# Patient Record
Sex: MALE | Race: WHITE | HISPANIC OR LATINO | Employment: FULL TIME | URBAN - NONMETROPOLITAN AREA
[De-identification: names, ages, dates, MRNs, and addresses within clinical notes are randomized per-mention and may not be internally consistent; named-entity substitution may affect disease eponyms.]

---

## 2023-02-12 ENCOUNTER — HOSPITAL ENCOUNTER (EMERGENCY)
Facility: HOSPITAL | Age: 43
Discharge: HOME | End: 2023-02-13
Attending: EMERGENCY MEDICINE

## 2023-02-12 DIAGNOSIS — K35.30 ACUTE APPENDICITIS WITH LOCALIZED PERITONITIS, WITHOUT PERFORATION, ABSCESS, OR GANGRENE: Primary | ICD-10-CM

## 2023-02-12 DIAGNOSIS — K37 APPENDICITIS: ICD-10-CM

## 2023-02-12 LAB
BACTERIA UR QL AUTO: ABNORMAL /HPF
BILIRUB UR QL STRIP: NEGATIVE
CLARITY UR: CLEAR
COLOR UR: ABNORMAL
GLUCOSE UR STRIP-MCNC: NEGATIVE MG/DL
HGB UR QL STRIP.AUTO: ABNORMAL
HYALINE CASTS UR QL AUTO: ABNORMAL /LPF
KETONES UR QL STRIP: NEGATIVE
LEUKOCYTE ESTERASE UR QL STRIP.AUTO: NEGATIVE
NITRITE UR QL STRIP: NEGATIVE
PH UR STRIP.AUTO: 7 [PH] (ref 5–8)
PROT UR QL STRIP: ABNORMAL
RBC # UR STRIP: ABNORMAL /HPF
REF LAB TEST METHOD: ABNORMAL
SP GR UR STRIP: 1.02 (ref 1–1.03)
SQUAMOUS #/AREA URNS HPF: ABNORMAL /HPF
UROBILINOGEN UR QL STRIP: ABNORMAL
WBC # UR STRIP: ABNORMAL /HPF

## 2023-02-12 PROCEDURE — 99284 EMERGENCY DEPT VISIT MOD MDM: CPT

## 2023-02-12 PROCEDURE — 81001 URINALYSIS AUTO W/SCOPE: CPT | Performed by: EMERGENCY MEDICINE

## 2023-02-12 RX ORDER — ONDANSETRON 2 MG/ML
4 INJECTION INTRAMUSCULAR; INTRAVENOUS ONCE
Status: COMPLETED | OUTPATIENT
Start: 2023-02-12 | End: 2023-02-13

## 2023-02-12 RX ORDER — SODIUM CHLORIDE 0.9 % (FLUSH) 0.9 %
10 SYRINGE (ML) INJECTION AS NEEDED
Status: DISCONTINUED | OUTPATIENT
Start: 2023-02-12 | End: 2023-02-13 | Stop reason: HOSPADM

## 2023-02-12 RX ORDER — KETOROLAC TROMETHAMINE 30 MG/ML
15 INJECTION, SOLUTION INTRAMUSCULAR; INTRAVENOUS ONCE
Status: COMPLETED | OUTPATIENT
Start: 2023-02-12 | End: 2023-02-13

## 2023-02-13 ENCOUNTER — ANESTHESIA EVENT (OUTPATIENT)
Dept: PERIOP | Facility: HOSPITAL | Age: 43
End: 2023-02-13

## 2023-02-13 ENCOUNTER — APPOINTMENT (OUTPATIENT)
Dept: CT IMAGING | Facility: HOSPITAL | Age: 43
End: 2023-02-13

## 2023-02-13 ENCOUNTER — ANESTHESIA (OUTPATIENT)
Dept: PERIOP | Facility: HOSPITAL | Age: 43
End: 2023-02-13

## 2023-02-13 VITALS
TEMPERATURE: 97.6 F | HEART RATE: 96 BPM | OXYGEN SATURATION: 96 % | SYSTOLIC BLOOD PRESSURE: 120 MMHG | HEIGHT: 72 IN | DIASTOLIC BLOOD PRESSURE: 88 MMHG | RESPIRATION RATE: 16 BRPM | BODY MASS INDEX: 30.2 KG/M2 | WEIGHT: 223 LBS

## 2023-02-13 PROBLEM — K35.30 ACUTE APPENDICITIS WITH LOCALIZED PERITONITIS, WITHOUT PERFORATION, ABSCESS, OR GANGRENE: Status: ACTIVE | Noted: 2023-02-13

## 2023-02-13 LAB
ALBUMIN SERPL-MCNC: 4.6 G/DL (ref 3.5–5.2)
ALBUMIN/GLOB SERPL: 1.3 G/DL
ALP SERPL-CCNC: 77 U/L (ref 39–117)
ALT SERPL W P-5'-P-CCNC: 77 U/L (ref 1–41)
ANION GAP SERPL CALCULATED.3IONS-SCNC: 13.3 MMOL/L (ref 5–15)
AST SERPL-CCNC: 22 U/L (ref 1–40)
BASOPHILS # BLD AUTO: 0.05 10*3/MM3 (ref 0–0.2)
BASOPHILS NFR BLD AUTO: 0.3 % (ref 0–1.5)
BILIRUB SERPL-MCNC: 4 MG/DL (ref 0–1.2)
BUN SERPL-MCNC: 15 MG/DL (ref 6–20)
BUN/CREAT SERPL: 16.9 (ref 7–25)
CALCIUM SPEC-SCNC: 9.4 MG/DL (ref 8.6–10.5)
CHLORIDE SERPL-SCNC: 94 MMOL/L (ref 98–107)
CO2 SERPL-SCNC: 23.7 MMOL/L (ref 22–29)
CREAT SERPL-MCNC: 0.89 MG/DL (ref 0.76–1.27)
DEPRECATED RDW RBC AUTO: 40.6 FL (ref 37–54)
EGFRCR SERPLBLD CKD-EPI 2021: 109.7 ML/MIN/1.73
EOSINOPHIL # BLD AUTO: 0.01 10*3/MM3 (ref 0–0.4)
EOSINOPHIL NFR BLD AUTO: 0.1 % (ref 0.3–6.2)
ERYTHROCYTE [DISTWIDTH] IN BLOOD BY AUTOMATED COUNT: 14 % (ref 12.3–15.4)
GLOBULIN UR ELPH-MCNC: 3.5 GM/DL
GLUCOSE SERPL-MCNC: 134 MG/DL (ref 65–99)
HCT VFR BLD AUTO: 45.2 % (ref 37.5–51)
HGB BLD-MCNC: 15.2 G/DL (ref 13–17.7)
HOLD SPECIMEN: NORMAL
HOLD SPECIMEN: NORMAL
IMM GRANULOCYTES # BLD AUTO: 0.1 10*3/MM3 (ref 0–0.05)
IMM GRANULOCYTES NFR BLD AUTO: 0.7 % (ref 0–0.5)
LIPASE SERPL-CCNC: 22 U/L (ref 13–60)
LYMPHOCYTES # BLD AUTO: 1.46 10*3/MM3 (ref 0.7–3.1)
LYMPHOCYTES NFR BLD AUTO: 10.2 % (ref 19.6–45.3)
MCH RBC QN AUTO: 26.7 PG (ref 26.6–33)
MCHC RBC AUTO-ENTMCNC: 33.6 G/DL (ref 31.5–35.7)
MCV RBC AUTO: 79.4 FL (ref 79–97)
MONOCYTES # BLD AUTO: 0.88 10*3/MM3 (ref 0.1–0.9)
MONOCYTES NFR BLD AUTO: 6.1 % (ref 5–12)
NEUTROPHILS NFR BLD AUTO: 11.88 10*3/MM3 (ref 1.7–7)
NEUTROPHILS NFR BLD AUTO: 82.6 % (ref 42.7–76)
NRBC BLD AUTO-RTO: 0 /100 WBC (ref 0–0.2)
PLATELET # BLD AUTO: 253 10*3/MM3 (ref 140–450)
PMV BLD AUTO: 8.2 FL (ref 6–12)
POTASSIUM SERPL-SCNC: 3.7 MMOL/L (ref 3.5–5.2)
PROT SERPL-MCNC: 8.1 G/DL (ref 6–8.5)
RBC # BLD AUTO: 5.69 10*6/MM3 (ref 4.14–5.8)
SODIUM SERPL-SCNC: 131 MMOL/L (ref 136–145)
WBC NRBC COR # BLD: 14.38 10*3/MM3 (ref 3.4–10.8)
WHOLE BLOOD HOLD COAG: NORMAL
WHOLE BLOOD HOLD SPECIMEN: NORMAL

## 2023-02-13 PROCEDURE — 99284 EMERGENCY DEPT VISIT MOD MDM: CPT | Performed by: SURGERY

## 2023-02-13 PROCEDURE — 96375 TX/PRO/DX INJ NEW DRUG ADDON: CPT

## 2023-02-13 PROCEDURE — 0 LIDOCAINE 1 % SOLUTION 20 ML VIAL: Performed by: SURGERY

## 2023-02-13 PROCEDURE — 83690 ASSAY OF LIPASE: CPT

## 2023-02-13 PROCEDURE — 85025 COMPLETE CBC W/AUTO DIFF WBC: CPT

## 2023-02-13 PROCEDURE — 74177 CT ABD & PELVIS W/CONTRAST: CPT

## 2023-02-13 PROCEDURE — 0 AMPICILLIN-SULBACTAM PER 1.5 G: Performed by: EMERGENCY MEDICINE

## 2023-02-13 PROCEDURE — 25010000002 ONDANSETRON PER 1 MG

## 2023-02-13 PROCEDURE — 93005 ELECTROCARDIOGRAM TRACING: CPT | Performed by: EMERGENCY MEDICINE

## 2023-02-13 PROCEDURE — 25010000002 SUCCINYLCHOLINE PER 20 MG: Performed by: NURSE ANESTHETIST, CERTIFIED REGISTERED

## 2023-02-13 PROCEDURE — 25010000002 ONDANSETRON PER 1 MG: Performed by: NURSE ANESTHETIST, CERTIFIED REGISTERED

## 2023-02-13 PROCEDURE — 25010000002 PROPOFOL 200 MG/20ML EMULSION: Performed by: NURSE ANESTHETIST, CERTIFIED REGISTERED

## 2023-02-13 PROCEDURE — 25010000002 IOPAMIDOL 61 % SOLUTION: Performed by: EMERGENCY MEDICINE

## 2023-02-13 PROCEDURE — 44970 LAPAROSCOPY APPENDECTOMY: CPT | Performed by: SURGERY

## 2023-02-13 PROCEDURE — 80053 COMPREHEN METABOLIC PANEL: CPT

## 2023-02-13 PROCEDURE — 96374 THER/PROPH/DIAG INJ IV PUSH: CPT

## 2023-02-13 PROCEDURE — 25010000002 DEXAMETHASONE PER 1 MG: Performed by: NURSE ANESTHETIST, CERTIFIED REGISTERED

## 2023-02-13 PROCEDURE — 25010000002 MORPHINE PER 10 MG: Performed by: EMERGENCY MEDICINE

## 2023-02-13 PROCEDURE — 25010000002 KETOROLAC TROMETHAMINE PER 15 MG

## 2023-02-13 DEVICE — LIGACLIP 10-M/L, 10MM ENDOSCOPIC ROTATING MULTIPLE CLIP APPLIERS
Type: IMPLANTABLE DEVICE | Site: ABDOMEN | Status: FUNCTIONAL
Brand: LIGACLIP

## 2023-02-13 DEVICE — THE ECHELON, ECHELON ENDOPATH™ AND ECHELON FLEX™ FAMILIES OF ENDOSCOPIC LINEAR CUTTERS AND RELOADS ARE STERILE, SINGLE PATIENT USE INSTRUMENTS THAT SIMULTANEOUSLY CUT AND STAPLE TISSUE. THERE ARE SIX STAGGERED ROWS OF STAPLES, THREE ON EITHER SIDE OF THE CUT LINE. THE 45 MM INSTRUMENTS HAVE A STAPLE LINE THATIS APPROXIMATELY 45 MM LONG AND A CUT LINE THAT IS APPROXIMATELY 42 MM LONG. THE SHAFT CAN ROTATE FREELY IN BOTH DIRECTIONS AND AN ARTICULATION MECHANISM ON ARTICULATING INSTRUMENTS ENABLES BENDING THE DISTAL PORTIONOF THE SHAFT TO FACILITATE LATERAL ACCESS OF THE OPERATIVE SITE.THE INSTRUMENTS ARE SHIPPED WITHOUT A RELOAD AND MUST BE LOADED PRIOR TO USE. A STAPLE RETAINING CAP ON THE RELOAD PROTECTS THE STAPLE LEG POINTS DURING SHIPPING AND TRANSPORTATION. THE INSTRUMENTS’ LOCK-OUT FEATURE IS DESIGNED TO PREVENT A USED RELOAD FROM BEING REFIRED.
Type: IMPLANTABLE DEVICE | Site: ABDOMEN | Status: FUNCTIONAL
Brand: ECHELON ENDOPATH

## 2023-02-13 DEVICE — FLOSEAL HEMOSTATIC MATRIX, 10ML
Type: IMPLANTABLE DEVICE | Site: ABDOMEN | Status: FUNCTIONAL
Brand: FLOSEAL HEMOSTATIC MATRIX

## 2023-02-13 RX ORDER — ONDANSETRON 2 MG/ML
INJECTION INTRAMUSCULAR; INTRAVENOUS AS NEEDED
Status: DISCONTINUED | OUTPATIENT
Start: 2023-02-13 | End: 2023-02-13 | Stop reason: SURG

## 2023-02-13 RX ORDER — SODIUM CHLORIDE 9 MG/ML
125 INJECTION, SOLUTION INTRAVENOUS CONTINUOUS
Status: DISCONTINUED | OUTPATIENT
Start: 2023-02-13 | End: 2023-02-13 | Stop reason: HOSPADM

## 2023-02-13 RX ORDER — SODIUM CHLORIDE 450 MG/100ML
INJECTION, SOLUTION INTRAVENOUS CONTINUOUS PRN
Status: DISCONTINUED | OUTPATIENT
Start: 2023-02-13 | End: 2023-02-13 | Stop reason: SURG

## 2023-02-13 RX ORDER — ONDANSETRON 4 MG/1
4 TABLET, FILM COATED ORAL DAILY PRN
Qty: 10 TABLET | Refills: 1 | Status: SHIPPED | OUTPATIENT
Start: 2023-02-13 | End: 2024-02-13

## 2023-02-13 RX ORDER — MORPHINE SULFATE 2 MG/ML
2 INJECTION, SOLUTION INTRAMUSCULAR; INTRAVENOUS
Status: DISCONTINUED | OUTPATIENT
Start: 2023-02-13 | End: 2023-02-13 | Stop reason: HOSPADM

## 2023-02-13 RX ORDER — LIDOCAINE HYDROCHLORIDE 20 MG/ML
INJECTION, SOLUTION INTRAVENOUS AS NEEDED
Status: DISCONTINUED | OUTPATIENT
Start: 2023-02-13 | End: 2023-02-13 | Stop reason: SURG

## 2023-02-13 RX ORDER — AMOXICILLIN AND CLAVULANATE POTASSIUM 875; 125 MG/1; MG/1
1 TABLET, FILM COATED ORAL 2 TIMES DAILY
Qty: 10 TABLET | Refills: 0 | Status: SHIPPED | OUTPATIENT
Start: 2023-02-13 | End: 2023-02-18

## 2023-02-13 RX ORDER — PROPOFOL 10 MG/ML
INJECTION, EMULSION INTRAVENOUS AS NEEDED
Status: DISCONTINUED | OUTPATIENT
Start: 2023-02-13 | End: 2023-02-13 | Stop reason: SURG

## 2023-02-13 RX ORDER — LORAZEPAM 2 MG/ML
1 INJECTION INTRAMUSCULAR ONCE AS NEEDED
Status: DISCONTINUED | OUTPATIENT
Start: 2023-02-13 | End: 2023-02-13 | Stop reason: HOSPADM

## 2023-02-13 RX ORDER — DULOXETIN HYDROCHLORIDE 30 MG/1
120 CAPSULE, DELAYED RELEASE ORAL ONCE
Status: DISCONTINUED | OUTPATIENT
Start: 2023-02-13 | End: 2023-02-13

## 2023-02-13 RX ORDER — HYDROCODONE BITARTRATE AND ACETAMINOPHEN 5; 325 MG/1; MG/1
1-2 TABLET ORAL EVERY 4 HOURS PRN
Qty: 12 TABLET | Refills: 0 | Status: SHIPPED | OUTPATIENT
Start: 2023-02-13 | End: 2023-02-15

## 2023-02-13 RX ORDER — SUCCINYLCHOLINE CHLORIDE 20 MG/ML
INJECTION INTRAMUSCULAR; INTRAVENOUS AS NEEDED
Status: DISCONTINUED | OUTPATIENT
Start: 2023-02-13 | End: 2023-02-13 | Stop reason: SURG

## 2023-02-13 RX ORDER — IBUPROFEN 800 MG/1
800 TABLET ORAL EVERY 6 HOURS PRN
Qty: 16 TABLET | Refills: 0 | Status: SHIPPED | OUTPATIENT
Start: 2023-02-13 | End: 2023-02-18

## 2023-02-13 RX ORDER — DEXAMETHASONE SODIUM PHOSPHATE 4 MG/ML
INJECTION, SOLUTION INTRA-ARTICULAR; INTRALESIONAL; INTRAMUSCULAR; INTRAVENOUS; SOFT TISSUE AS NEEDED
Status: DISCONTINUED | OUTPATIENT
Start: 2023-02-13 | End: 2023-02-13 | Stop reason: SURG

## 2023-02-13 RX ORDER — CHOLECALCIFEROL (VITAMIN D3) 125 MCG
5 CAPSULE ORAL ONCE
Status: DISCONTINUED | OUTPATIENT
Start: 2023-02-13 | End: 2023-02-13

## 2023-02-13 RX ORDER — SODIUM CHLORIDE 9 MG/ML
100 INJECTION, SOLUTION INTRAVENOUS CONTINUOUS
Status: DISCONTINUED | OUTPATIENT
Start: 2023-02-13 | End: 2023-02-13 | Stop reason: HOSPADM

## 2023-02-13 RX ORDER — MEPERIDINE HYDROCHLORIDE 25 MG/ML
25 INJECTION INTRAMUSCULAR; INTRAVENOUS; SUBCUTANEOUS ONCE AS NEEDED
Status: DISCONTINUED | OUTPATIENT
Start: 2023-02-13 | End: 2023-02-13 | Stop reason: HOSPADM

## 2023-02-13 RX ORDER — ONDANSETRON 2 MG/ML
4 INJECTION INTRAMUSCULAR; INTRAVENOUS ONCE AS NEEDED
Status: COMPLETED | OUTPATIENT
Start: 2023-02-13 | End: 2023-02-13

## 2023-02-13 RX ADMIN — SODIUM CHLORIDE: 4.5 INJECTION, SOLUTION INTRAVENOUS at 05:11

## 2023-02-13 RX ADMIN — SODIUM CHLORIDE: 4.5 INJECTION, SOLUTION INTRAVENOUS at 03:02

## 2023-02-13 RX ADMIN — DEXAMETHASONE SODIUM PHOSPHATE 4 MG: 4 INJECTION, SOLUTION INTRAMUSCULAR; INTRAVENOUS at 03:08

## 2023-02-13 RX ADMIN — KETOROLAC TROMETHAMINE 15 MG: 30 INJECTION, SOLUTION INTRAMUSCULAR; INTRAVENOUS at 00:04

## 2023-02-13 RX ADMIN — PROPOFOL 200 MG: 10 INJECTION, EMULSION INTRAVENOUS at 03:08

## 2023-02-13 RX ADMIN — IOPAMIDOL 99 ML: 612 INJECTION, SOLUTION INTRAVENOUS at 00:55

## 2023-02-13 RX ADMIN — SUCCINYLCHOLINE CHLORIDE 100 MG: 20 INJECTION, SOLUTION INTRAMUSCULAR; INTRAVENOUS at 03:08

## 2023-02-13 RX ADMIN — AMPICILLIN SODIUM AND SULBACTAM SODIUM 3 G: 2; 1 INJECTION, POWDER, FOR SOLUTION INTRAVENOUS at 02:15

## 2023-02-13 RX ADMIN — SODIUM CHLORIDE 125 ML/HR: 9 INJECTION, SOLUTION INTRAVENOUS at 02:38

## 2023-02-13 RX ADMIN — ONDANSETRON 4 MG: 2 INJECTION INTRAMUSCULAR; INTRAVENOUS at 03:08

## 2023-02-13 RX ADMIN — MORPHINE SULFATE 4 MG: 4 INJECTION, SOLUTION INTRAMUSCULAR; INTRAVENOUS at 02:12

## 2023-02-13 RX ADMIN — SODIUM CHLORIDE 1000 ML: 9 INJECTION, SOLUTION INTRAVENOUS at 00:04

## 2023-02-13 RX ADMIN — LIDOCAINE HYDROCHLORIDE 60 MG: 20 INJECTION, SOLUTION INTRAVENOUS at 03:08

## 2023-02-13 RX ADMIN — ONDANSETRON 4 MG: 2 INJECTION INTRAMUSCULAR; INTRAVENOUS at 07:16

## 2023-02-13 RX ADMIN — ONDANSETRON 4 MG: 2 INJECTION INTRAMUSCULAR; INTRAVENOUS at 00:04

## 2023-02-13 RX ADMIN — SUGAMMADEX 200 MG: 100 INJECTION, SOLUTION INTRAVENOUS at 05:21

## 2023-02-13 NOTE — ANESTHESIA POSTPROCEDURE EVALUATION
Patient: Omar Gan    Procedure Summary     Date: 02/13/23 Room / Location: [facility]  /  [facility]    Anesthesia Start: 0302 Anesthesia Stop: 0523    Procedure: APPENDECTOMY LAPAROSCOPIC (Abdomen) Diagnosis:       Appendicitis      (appendicitis)    Surgeons: Mayito Wyatt MD Provider: Harjinder Husain CRNA    Anesthesia Type: general ASA Status: 2          Anesthesia Type: general    Vitals  No vitals data found for the desired time range.          Post Anesthesia Care and Evaluation    Patient location during evaluation: PACU  Patient participation: complete - patient participated  Level of consciousness: awake  Pain score: 3  Pain management: adequate    Airway patency: patent  Anesthetic complications: No anesthetic complications  PONV Status: controlled  Cardiovascular status: acceptable and stable  Respiratory status: acceptable and face mask  Hydration status: acceptable    Comments: SEE NURSING NOTES FOR POST OP VITAL SIGNS

## 2023-02-13 NOTE — OP NOTE
PATIENT:    Omar Gan    DATE OF SURGERY:  2/13/2023    PHYSICIAN:    Mayito Wyatt MD    REFERRING PHYSICIAN:  No ref. provider found    YOB: 1980    PREOPERATIVE DIAGNOSIS: appendicitis    POSTOPERATIVE DIAGNOSIS: Appendicitis with possible localized perforation    PROCEDURE: Laparoscopic appendectomy         Specimen: Appendix    EBL: 50    Complications: None        ANESTHESIA:  General Anesthesia     OPERATIVE PROCEDURE:  The patient was taken to the operating room, placed in the supine position, and given general endotracheal anesthesia.  They were prepped and draped in the normal sterile fashion.  They did receive preoperative IV antibiotics.  The nursing staff did perform intraoperative timeout prior to the incision.    Due to previous periumbilical hernia surgery I placed an Optiview 5 mm trocar through the left upper abdomen into the peritoneal cavity.  Parent Celeste was insufflated to 15 mmHg CO2 extensive adhesions to the anterior abdominal wall especially around the umbilicus were encountered.  Mesh was also identified and a 12 mm and 5 mm lower midline ports were placed avoiding this mesh.  A right upper quadrant 5 mm port was also placed.    Dissection was begun to identify the appendix.  The appendix was in the right upper abdomen abutting the liver.  Ultimately the base of the appendix was identified and a window was made through the mesoappendix and an Endo TODD stapler was used to divide the base of the appendix.  Due to the location and extensive inflammatory process the appendix was difficult to dissected free and I divided the mesoappendix between clips and then sharply divided the mesoappendix.  This dissection was continued slowly as the appendix was retrocecal.  Ultimately the entire appendix was dissected free and removed.  Appendix was then removed the 12 mm port site using an Endo Catch bag.  I reinspected the operative area and during dissection there was a probable  localized area of contained perforation but without abscess.  There was some generalized ooze from the dissected area and since I was not able to clip these areas I used Floseal for added hemostasis.  At the conclusion there was no ongoing bleeding.  Ports were removed.  No bleeding is evident at the port sites.  12 mm trocar site fascia was already closed with a figure-of-eight Vicryl closure.  Wounds were closed with interrupted deep dermal PDS closure.  Steri-Strips and dressings were applied.  There were no intraoperative complications.  Patient tolerated procedure well.    The patient was stable at this point in time and subsequently transferred back to the recovery room in stable condition.       Mayito Wyatt MD  2/13/2023  05:15 EST

## 2023-02-13 NOTE — ANESTHESIA PREPROCEDURE EVALUATION
Anesthesia Evaluation     Patient summary reviewed and Nursing notes reviewed   no history of anesthetic complications:  NPO Solid Status: > 8 hours  NPO Liquid Status: > 8 hours           Airway   Mallampati: I  TM distance: >3 FB  Neck ROM: full  no difficulty expected  Dental - normal exam     Pulmonary - normal exam   (+) asthma,  Cardiovascular - negative cardio ROS and normal exam        Neuro/Psych- negative ROS  GI/Hepatic/Renal/Endo - negative ROS     Musculoskeletal (-) negative ROS    Abdominal    Substance History - negative use     OB/GYN negative ob/gyn ROS         Other - negative ROS                       Anesthesia Plan    ASA 2     general     intravenous induction     Anesthetic plan, risks, benefits, and alternatives have been provided, discussed and informed consent has been obtained with: patient.        CODE STATUS:

## 2023-02-13 NOTE — DISCHARGE INSTRUCTIONS
Rest today  No pushing,pulling,tugging,heavy lifting, or strenuous activity   No major decision making,driving,or drinking alcoholic beverages for 24 hours due to the sedation you received    Always use good hand hygiene/washing technique    To assist you in voiding:  Drink plenty of fluids  Listen to running water while attempting to void.    If you are unable to urinate and you have an uncomfortable urge to void or it has been   6 hours since you were discharged, return to the Emergency Room No driving on pain medication.     Please follow all post op instructions and follow up appointment time from your physician's office included in your discharge packet.  .    No pushing, pulling, tugging,  heavy lifting, or strenuous activity.  No major decision making, driving, or drinking alcoholic beverages for 24 hours. ( due to the medications you have  received)  Always use good hand hygiene/washing techniques.  NO driving while taking pain medications.    * if you have an incision:  Check your incision area every day for signs of infection.   Check for:  * more redness, swelling, or pain  *more fluid or blood  *warmth  *pus or bad smell To assist you in voiding:  Drink plenty of fluids  Listen to running water while attempting to void.    If you are unable to urinate and you have an uncomfortable urge to void or it has been   6 hours since you were discharged, return to the Emergency Room

## 2023-02-13 NOTE — ED PROVIDER NOTES
"Subjective  History of Present Illness:    Patient is a 42-year-old male here today with abdominal pain.  He states that last night and has persisted throughout the day.  Has had associated nausea and vomiting, and fever.  He went to one of the local urgent treatment centers and was advised to come to the ER for further evaluation and need for possible CT scan.  Patient reports that the pain is primarily to the right side of his abdomen especially to the lower region and feels like it is radiating towards his back.  He denies any urinary symptoms such as dysuria or urinary frequency.  He states that anything he tries to eat makes him feel nauseated and he will eventually vomit.  History of an umbilical hernia with repair, states that he has mesh.  Other history includes asthma and psoriasis.        Nurses Notes reviewed and agree, including vitals, allergies, social history and prior medical history.     REVIEW OF SYSTEMS: All systems reviewed and not pertinent unless noted.  Review of Systems    Past Medical History:   Diagnosis Date   • Asthma    • Psoriasis        Allergies:    Patient has no known allergies.      Past Surgical History:   Procedure Laterality Date   • HERNIA REPAIR           Social History     Socioeconomic History   • Marital status:    Tobacco Use   • Smoking status: Never   • Smokeless tobacco: Never   Substance and Sexual Activity   • Alcohol use: Yes     Comment: Socially         History reviewed. No pertinent family history.    Objective  Physical Exam:  /88   Pulse 105   Temp 100.2 °F (37.9 °C) (Oral)   Resp 20   Ht 182.9 cm (72\")   Wt 101 kg (223 lb)   SpO2 94%   BMI 30.24 kg/m²      Physical Exam  Vitals and nursing note reviewed.   Constitutional:       Appearance: Normal appearance. He is normal weight.   HENT:      Head: Normocephalic and atraumatic.   Cardiovascular:      Rate and Rhythm: Normal rate and regular rhythm.      Pulses: Normal pulses.      Heart " sounds: Normal heart sounds.   Pulmonary:      Effort: Pulmonary effort is normal.      Breath sounds: Normal breath sounds.   Abdominal:      General: Abdomen is flat. Bowel sounds are normal. There is no distension.      Palpations: Abdomen is soft.      Tenderness: There is abdominal tenderness in the right lower quadrant. Positive signs include McBurney's sign and psoas sign.      Comments: Patient also has tenderness that radiates to the right side of his abdomen mid axillary line and posterior axillary line   Musculoskeletal:      Right lower leg: No edema.      Left lower leg: No edema.   Skin:     General: Skin is warm and dry.      Capillary Refill: Capillary refill takes less than 2 seconds.   Neurological:      General: No focal deficit present.      Mental Status: He is alert and oriented to person, place, and time.   Psychiatric:         Mood and Affect: Mood normal.         Behavior: Behavior normal.         Procedures    ED Course:    ED Course as of 02/13/23 0224 Mon Feb 13, 2023   0131 Spoke with Dr. Wyatt about the CT findings showing acute appendicitis. [TA]      ED Course User Index  [TA] Chuckie Moore, TEN       Lab Results (last 24 hours)     Procedure Component Value Units Date/Time    Urinalysis With Microscopic If Indicated (No Culture) - Urine, Clean Catch [[id number]]  (Abnormal) Collected: 02/12/23 2319    Specimen: Urine, Clean Catch Updated: 02/12/23 2326     Color, UA Sac     Appearance, UA Clear     pH, UA 7.0     Specific Gravity, UA 1.020     Glucose, UA Negative     Ketones, UA Negative     Bilirubin, UA Negative     Blood, UA Moderate (2+)     Protein,  mg/dL (2+)     Leuk Esterase, UA Negative     Nitrite, UA Negative     Urobilinogen, UA 1.0 E.U./dL    Urinalysis, Microscopic Only - Urine, Clean Catch [[id number]]  (Abnormal) Collected: 02/12/23 2319    Specimen: Urine, Clean Catch Updated: 02/12/23 2335     RBC, UA 3-5 /HPF      WBC, UA 0-2 /HPF      Bacteria, UA  None Seen /HPF      Squamous Epithelial Cells, UA None Seen /HPF      Hyaline Casts, UA None Seen /LPF      Methodology Manual Light Microscopy    CBC & Differential [[id number]]  (Abnormal) Collected: 02/13/23 0006    Specimen: Blood Updated: 02/13/23 0012    Narrative:      The following orders were created for panel order CBC & Differential.  Procedure                               Abnormality         Status                     ---------                               -----------         ------                     CBC Auto Differential[[id number]]        Abnormal            Final result                 Please view results for these tests on the individual orders.    Comprehensive Metabolic Panel [[id number]]  (Abnormal) Collected: 02/13/23 0006    Specimen: Blood Updated: 02/13/23 0030     Glucose 134 mg/dL      BUN 15 mg/dL      Creatinine 0.89 mg/dL      Sodium 131 mmol/L      Potassium 3.7 mmol/L      Chloride 94 mmol/L      CO2 23.7 mmol/L      Calcium 9.4 mg/dL      Total Protein 8.1 g/dL      Albumin 4.6 g/dL      ALT (SGPT) 77 U/L      AST (SGOT) 22 U/L      Alkaline Phosphatase 77 U/L      Total Bilirubin 4.0 mg/dL      Globulin 3.5 gm/dL      A/G Ratio 1.3 g/dL      BUN/Creatinine Ratio 16.9     Anion Gap 13.3 mmol/L      eGFR 109.7 mL/min/1.73     Narrative:      GFR Normal >60  Chronic Kidney Disease <60  Kidney Failure <15      Lipase [[id number]]  (Normal) Collected: 02/13/23 0006    Specimen: Blood Updated: 02/13/23 0030     Lipase 22 U/L     CBC Auto Differential [[id number]]  (Abnormal) Collected: 02/13/23 0006    Specimen: Blood Updated: 02/13/23 0012     WBC 14.38 10*3/mm3      RBC 5.69 10*6/mm3      Hemoglobin 15.2 g/dL      Hematocrit 45.2 %      MCV 79.4 fL      MCH 26.7 pg      MCHC 33.6 g/dL      RDW 14.0 %      RDW-SD 40.6 fl      MPV 8.2 fL      Platelets 253 10*3/mm3      Neutrophil % 82.6 %      Lymphocyte % 10.2 %      Monocyte % 6.1 %      Eosinophil % 0.1 %      Basophil % 0.3 %       Immature Grans % 0.7 %      Neutrophils, Absolute 11.88 10*3/mm3      Lymphocytes, Absolute 1.46 10*3/mm3      Monocytes, Absolute 0.88 10*3/mm3      Eosinophils, Absolute 0.01 10*3/mm3      Basophils, Absolute 0.05 10*3/mm3      Immature Grans, Absolute 0.10 10*3/mm3      nRBC 0.0 /100 WBC            CT Abdomen Pelvis With Contrast    Addendum Date: 2/13/2023    ADDENDUM REPORT ADDENDUM: This report was discussed with Chuckie Moore on Feb 13, 2023 01:29:00 EST. Authenticated and Electronically Signed by Isidro Nelson MD on 02/13/2023 01:29:28 AM    Result Date: 2/13/2023  FINAL REPORT TECHNIQUE: null CLINICAL HISTORY: RLQ pain COMPARISON: null FINDINGS: CT abdomen and pelvis with contrast Comparison: None Findings: Distal aspects appendix dilated at 11 mm with medial inflammatory stranding. Mid and distal aspects of the appendix abut the adjacent liver edge. Small amount of periappendiceal fluid is also demonstrated. Findings do not arise from adjacent terminal ileum or cecum. Proximal and mid aspects of the appendix are normal. Findings are concerning for acute appendicitis. Right middle and lower lobe atelectasis noted. Left lung base clear. No acute bony abnormalities. Fatty infiltration of liver without focal abnormality. Gallbladder and spleen within normal limits. Pancreas, adrenal glands and kidneys unremarkable. Renal cysts without stones or hydronephrosis. No aortic aneurysm or dissection noted. No significant free fluid or adenopathy noted in the pelvis. No evidence for diverticulitis.     Impression: Impression: Acute appendicitis without complicating features Authenticated and Electronically Signed by Isidro Nelson MD on 02/13/2023 01:27:02 AM         [facility]    Initial impression of presenting illness: Abdominal pain    DDX: includes but is not limited to: Appendicitis, small bowel obstruction, kidney stone, hernia, UTI    Patient arrives tachycardic, hemodynamically stable with vitals interpreted by  myself.     Pertinent features from physical exam: Abdominal pain as described above.    Initial diagnostic plan: IV, fluids, Toradol, Zofran, labs, CT scan with contrast    Results from initial plan were reviewed and interpreted by me revealing white blood count of 14.3.  Other labs within her normal ranges.  No evidence of infection on urine, some mild blood.  CT report shows acute appendicitis.    Diagnostic information from other sources: Medical record    Interventions / Re-evaluation: Patient did not have much improvement in his pain with the medication.    Results/clinical rationale were discussed with Dr. Sylvester    Consultations/Discussion of results with other physicians: Spoke with Dr. Wyatt about the patient and the CT findings.  We will plan to take patient to OR.    Disposition plan: Patient is a 42-year-old male here today for acute appendicitis.  He received IV fluid bolus, as well as Zofran and Toradol.  After receiving the diagnosis of appendicitis, he was given morphine as needed to help with pain, and is awaiting to be taken to the OR for further treatment.  -----    Final diagnoses:   Acute appendicitis with localized peritonitis, without perforation, abscess, or gangrene        Chuckie Moore, APRN  02/13/23 0224

## 2023-02-13 NOTE — ANESTHESIA PROCEDURE NOTES
Airway  Urgency: elective    Date/Time: 2/13/2023 3:08 AM  Airway not difficult    General Information and Staff    Patient location during procedure: OR  CRNA/CAA: Harjinder Husain CRNA    Indications and Patient Condition  Indications for airway management: airway protection    Preoxygenated: yes  Mask difficulty assessment: 1 - vent by mask    Final Airway Details  Final airway type: endotracheal airway      Successful airway: ETT  Cuffed: yes   Successful intubation technique: direct laryngoscopy  Endotracheal tube insertion site: oral  Blade: Orestes  Blade size: 3  ETT size (mm): 7.0  Cormack-Lehane Classification: grade I - full view of glottis  Placement verified by: chest auscultation and capnometry   Measured from: lips  ETT/EBT  to lips (cm): 21  Number of attempts at approach: 1  Assessment: lips, teeth, and gum same as pre-op and atraumatic intubation

## 2023-02-13 NOTE — H&P
[facility]   HISTORY AND PHYSICAL      Name:  Omar Gan   Age:  42 y.o.  Sex:  male  :  1980  MRN:  [id number]   Visit Number:  [id number]  Admission Date:  2023  Date Of Service:  23  Primary Care Physician:  Jason Johnson MD    Chief Complaint:     I have abdominal pain    History Of Presenting Illness:      Patient states that yesterday he developed abdominal pain.  Right lower abdomen radiating into the back.  Associated with nausea and vomiting.  He came to emergency room was further evaluated and found to have appendicitis.    Review Of Systems:     General ROS: Patient denies any fevers, chills or loss of consciousness.  No complaints of generalized weakness  Psychological ROS: Denies any hallucinations and delusions.  Ophthalmic ROS: no transient loss of vision.  ENT ROS: Denies sore throat, nasal congestion or ear pain.   Allergy and Immunology ROS: Denies rash or itching.  Hematological and Lymphatic ROS: Denies neck swelling or easy bleeding.  Respiratory ROS: Denies cough or shortness of breath.   Cardiovascular ROS: Denies chest pain or palpitations. No history of exertional chest pain.   Gastrointestinal ROS: As per history of present illness  Genito-Urinary ROS: Denies dysuria or hematuria.  Musculoskeletal ROS: no back pain. No muscle pain. No calf pain.   Neurological ROS: Denies any focal weakness. No loss of consciousness. Denies any numbness.   Dermatological ROS: Denies any redness or pruritis.     Past Medical History:    Past Medical History:   Diagnosis Date   • Asthma    • Psoriasis        Past Surgical history:    Past Surgical History:   Procedure Laterality Date   • HERNIA REPAIR         Social History:    Social History     Socioeconomic History   • Marital status:    Tobacco Use   • Smoking status: Never   • Smokeless tobacco: Never   Substance and Sexual Activity   • Alcohol use: Yes     Comment: Socially       Family  History:    History reviewed. No pertinent family history.    Allergies:      Patient has no known allergies.    Home Medications:    Prior to Admission Medications     None             ED Medications:    Medications   sodium chloride 0.9 % flush 10 mL (has no administration in time range)   morphine injection 4 mg (4 mg Intravenous Given 2/13/23 0212)   sodium chloride 0.9 % infusion (125 mL/hr Intravenous New Bag 2/13/23 0238)   sodium chloride 0.9 % infusion (has no administration in time range)   sodium chloride 0.9 % bolus 1,000 mL (0 mL Intravenous Stopped 2/13/23 0236)   ondansetron (ZOFRAN) injection 4 mg (4 mg Intravenous Given 2/13/23 0004)   ketorolac (TORADOL) injection 15 mg (15 mg Intravenous Given 2/13/23 0004)   iopamidol (ISOVUE-300) 61 % injection 100 mL (99 mL Intravenous Given 2/13/23 0055)   ampicillin-sulbactam (UNASYN) 3 g in sodium chloride 0.9 % 100 mL IVPB (3 g Intravenous New Bag 2/13/23 0215)       Vital Signs:    Temp:  [100.2 °F (37.9 °C)] 100.2 °F (37.9 °C)  Heart Rate:  [102-116] 105  Resp:  [18-20] 20  BP: (110-138)/(80-93) 117/88        02/12/23 2234   Weight: 101 kg (223 lb)       Body mass index is 30.24 kg/m².    Physical Exam:      General Appearance:  Alert and cooperative, not in any acute distress.   Head:  Atraumatic and normocephalic, without obvious abnormality.   Eyes:          PERRLA, conjunctivae and sclerae normal, no Icterus. No pallor. Extra-occular movements are within normal limits.   Ears:  Ears appear intact with no abnormalities noted.   Throat: No oral lesions, no thrush, oral mucosa moist.   Respiratory/Lungs:   Breath sounds heard bilaterally equally.  No crackles or wheezing. No Pleural rub or bronchial breathing. Normal respiratory effort.    Cardiovascular/Heart:  Normal S1 and S2, no murmur. No edema   GI/Abdomen:   No masses, no hepatosplenomegaly. Soft,  non-distended, Tender with localized guarding in the right lower quadrant.     Musculoskeletal/  Extremities:   Moves all extremities well   Skin: No bleeding, bruising or rash, no induration   Psychiatric : Alert and oriented ×3.  No depression or anxiety    Neurologic: Cranial nerves 2 - 12 grossly intact, sensation intact, Motor power is normal and equal bilaterally.       EKG:          Labs:    Lab Results (last 24 hours)     Procedure Component Value Units Date/Time    [place] Draw [[id number]] Collected: 02/13/23 0006    Specimen: Blood Updated: 02/13/23 0115    Narrative:      The following orders were created for panel order [place] Draw.  Procedure                               Abnormality         Status                     ---------                               -----------         ------                     Green Top (Gel)[[id number]]                                  Final result               Lavender Top[[id number]]                                     Final result               Gold Top - SST[[id number]]                                   Final result               Light Blue Top[[id number]]                                   Final result                 Please view results for these tests on the individual orders.    Lavender Top [[id number]] Collected: 02/13/23 0006    Specimen: Blood Updated: 02/13/23 0115     Extra Tube hold for add-on     Comment: Auto resulted       Gold Top - SST [[id number]] Collected: 02/13/23 0006    Specimen: Blood Updated: 02/13/23 0115     Extra Tube Hold for add-ons.     Comment: Auto resulted.       Light Blue Top [[id number]] Collected: 02/13/23 0006    Specimen: Blood Updated: 02/13/23 0115     Extra Tube Hold for add-ons.     Comment: Auto resulted       Green Top (Gel) [[id number]] Collected: 02/13/23 0006    Specimen: Blood Updated: 02/13/23 0115     Extra Tube Hold for add-ons.     Comment: Auto resulted.       Comprehensive Metabolic Panel [[id number]]  (Abnormal) Collected: 02/13/23 0006    Specimen: Blood Updated: 02/13/23 0030     Glucose 134 mg/dL      BUN 15 mg/dL       Creatinine 0.89 mg/dL      Sodium 131 mmol/L      Potassium 3.7 mmol/L      Chloride 94 mmol/L      CO2 23.7 mmol/L      Calcium 9.4 mg/dL      Total Protein 8.1 g/dL      Albumin 4.6 g/dL      ALT (SGPT) 77 U/L      AST (SGOT) 22 U/L      Alkaline Phosphatase 77 U/L      Total Bilirubin 4.0 mg/dL      Globulin 3.5 gm/dL      A/G Ratio 1.3 g/dL      BUN/Creatinine Ratio 16.9     Anion Gap 13.3 mmol/L      eGFR 109.7 mL/min/1.73     Narrative:      GFR Normal >60  Chronic Kidney Disease <60  Kidney Failure <15      Lipase [[id number]]  (Normal) Collected: 02/13/23 0006    Specimen: Blood Updated: 02/13/23 0030     Lipase 22 U/L     CBC & Differential [[id number]]  (Abnormal) Collected: 02/13/23 0006    Specimen: Blood Updated: 02/13/23 0012    Narrative:      The following orders were created for panel order CBC & Differential.  Procedure                               Abnormality         Status                     ---------                               -----------         ------                     CBC Auto Differential[[id number]]        Abnormal            Final result                 Please view results for these tests on the individual orders.    CBC Auto Differential [[id number]]  (Abnormal) Collected: 02/13/23 0006    Specimen: Blood Updated: 02/13/23 0012     WBC 14.38 10*3/mm3      RBC 5.69 10*6/mm3      Hemoglobin 15.2 g/dL      Hematocrit 45.2 %      MCV 79.4 fL      MCH 26.7 pg      MCHC 33.6 g/dL      RDW 14.0 %      RDW-SD 40.6 fl      MPV 8.2 fL      Platelets 253 10*3/mm3      Neutrophil % 82.6 %      Lymphocyte % 10.2 %      Monocyte % 6.1 %      Eosinophil % 0.1 %      Basophil % 0.3 %      Immature Grans % 0.7 %      Neutrophils, Absolute 11.88 10*3/mm3      Lymphocytes, Absolute 1.46 10*3/mm3      Monocytes, Absolute 0.88 10*3/mm3      Eosinophils, Absolute 0.01 10*3/mm3      Basophils, Absolute 0.05 10*3/mm3      Immature Grans, Absolute 0.10 10*3/mm3      nRBC 0.0 /100 WBC     Urinalysis,  Microscopic Only - Urine, Clean Catch [[id number]]  (Abnormal) Collected: 02/12/23 2319    Specimen: Urine, Clean Catch Updated: 02/12/23 2335     RBC, UA 3-5 /HPF      WBC, UA 0-2 /HPF      Bacteria, UA None Seen /HPF      Squamous Epithelial Cells, UA None Seen /HPF      Hyaline Casts, UA None Seen /LPF      Methodology Manual Light Microscopy    Urinalysis With Microscopic If Indicated (No Culture) - Urine, Clean Catch [[id number]]  (Abnormal) Collected: 02/12/23 2319    Specimen: Urine, Clean Catch Updated: 02/12/23 2326     Color, UA [place]     Appearance, UA Clear     pH, UA 7.0     Specific Gravity, UA 1.020     Glucose, UA Negative     Ketones, UA Negative     Bilirubin, UA Negative     Blood, UA Moderate (2+)     Protein,  mg/dL (2+)     Leuk Esterase, UA Negative     Nitrite, UA Negative     Urobilinogen, UA 1.0 E.U./dL          Radiology:    Imaging Results (Last 72 Hours)     Procedure Component Value Units Date/Time    CT Abdomen Pelvis With Contrast [[id number]] Collected: 02/13/23 0127     Updated: 02/13/23 0131    Addenda:        ADDENDUM REPORT    ADDENDUM:  This report was discussed with Chuckie Moore on Feb 13, 2023 01:29:00 EST.    Authenticated and Electronically Signed by Isidro Nelson MD on  02/13/2023 01:29:28 AM  Signed: 02/13/23 0129 by Isidro Nelson MD    Narrative:      FINAL REPORT    TECHNIQUE:  null    CLINICAL HISTORY:  RLQ pain    COMPARISON:  null    FINDINGS:  CT abdomen and pelvis with contrast    Comparison: None    Findings:    Distal aspects appendix dilated at 11 mm with medial inflammatory stranding.    Mid and distal aspects of the appendix abut the adjacent liver edge.    Small amount of periappendiceal fluid is also demonstrated.    Findings do not arise from adjacent terminal ileum or cecum.    Proximal and mid aspects of the appendix are normal.    Findings are concerning for acute appendicitis.    Right middle and lower lobe atelectasis noted.    Left lung base  clear. No acute bony abnormalities.    Fatty infiltration of liver without focal abnormality.    Gallbladder and spleen within normal limits.    Pancreas, adrenal glands and kidneys unremarkable.    Renal cysts without stones or hydronephrosis.    No aortic aneurysm or dissection noted.    No significant free fluid or adenopathy noted in the pelvis.    No evidence for diverticulitis.      Impression:      Impression:    Acute appendicitis without complicating features    Authenticated and Electronically Signed by Isidro Nelson MD on  02/13/2023 01:27:02 AM      I reviewed the CT scan and agree with the interpretation    Assessment:    Acute appendicitis    Plan:     I recommend a laparoscopic appendectomy to the patient.  The patient understands this procedure as well as the possibility of converting to an open procedure.  As well, the patient understands the risks which include but are not limited to bleeding, infection including abscess, bowel injury, cardiopulmonary risks etc. The patient understands    all of the above and wishes to proceed.    Mayito Wyatt MD  02/13/23  02:48 EST  .

## 2023-02-14 LAB — REF LAB TEST METHOD: NORMAL

## 2023-02-15 ENCOUNTER — TELEPHONE (OUTPATIENT)
Dept: SURGERY | Facility: CLINIC | Age: 43
End: 2023-02-15

## 2023-02-15 DIAGNOSIS — K35.30 ACUTE APPENDICITIS WITH LOCALIZED PERITONITIS, WITHOUT PERFORATION, ABSCESS, OR GANGRENE: Primary | ICD-10-CM

## 2023-02-15 RX ORDER — HYDROCODONE BITARTRATE AND ACETAMINOPHEN 7.5; 325 MG/1; MG/1
1 TABLET ORAL EVERY 6 HOURS PRN
Qty: 15 TABLET | Refills: 0 | Status: SHIPPED | OUTPATIENT
Start: 2023-02-15

## 2023-02-15 NOTE — TELEPHONE ENCOUNTER
Patient called requesting a refill on Hydrocodone 5-325 mg to be sent to [facility] pharmacy in [place]. Post-op appointment is scheduled for 2/21/23.

## 2023-02-20 ENCOUNTER — TELEPHONE (OUTPATIENT)
Dept: SURGERY | Facility: CLINIC | Age: 43
End: 2023-02-20

## 2023-02-20 ENCOUNTER — HOSPITAL ENCOUNTER (INPATIENT)
Facility: HOSPITAL | Age: 43
LOS: 2 days | Discharge: HOME | DRG: 862 | End: 2023-02-22
Attending: INTERNAL MEDICINE | Admitting: INTERNAL MEDICINE

## 2023-02-20 ENCOUNTER — HOSPITAL ENCOUNTER (OUTPATIENT)
Dept: CT IMAGING | Facility: HOSPITAL | Age: 43
Discharge: HOME | End: 2023-02-20
Admitting: STUDENT IN AN ORGANIZED HEALTH CARE EDUCATION/TRAINING PROGRAM

## 2023-02-20 ENCOUNTER — OFFICE VISIT (OUTPATIENT)
Dept: SURGERY | Facility: CLINIC | Age: 43
End: 2023-02-20

## 2023-02-20 VITALS
TEMPERATURE: 97.3 F | SYSTOLIC BLOOD PRESSURE: 142 MMHG | HEART RATE: 112 BPM | OXYGEN SATURATION: 98 % | DIASTOLIC BLOOD PRESSURE: 82 MMHG | HEIGHT: 72 IN | RESPIRATION RATE: 18 BRPM | WEIGHT: 218 LBS | BODY MASS INDEX: 29.53 KG/M2

## 2023-02-20 DIAGNOSIS — R10.11 RIGHT UPPER QUADRANT ABDOMINAL PAIN: Primary | ICD-10-CM

## 2023-02-20 DIAGNOSIS — R10.11 RIGHT UPPER QUADRANT ABDOMINAL PAIN: ICD-10-CM

## 2023-02-20 PROBLEM — T81.49XA ABSCESS AFTER PROCEDURE: Status: ACTIVE | Noted: 2023-02-20

## 2023-02-20 LAB
ALBUMIN SERPL-MCNC: 3.9 G/DL (ref 3.5–5.2)
ALBUMIN/GLOB SERPL: 0.8 G/DL
ALP SERPL-CCNC: 131 U/L (ref 39–117)
ALT SERPL W P-5'-P-CCNC: 72 U/L (ref 1–41)
ANION GAP SERPL CALCULATED.3IONS-SCNC: 12.5 MMOL/L (ref 5–15)
AST SERPL-CCNC: 27 U/L (ref 1–40)
BASOPHILS # BLD AUTO: 0.07 10*3/MM3 (ref 0–0.2)
BASOPHILS NFR BLD AUTO: 0.4 % (ref 0–1.5)
BILIRUB SERPL-MCNC: 1 MG/DL (ref 0–1.2)
BUN SERPL-MCNC: 8 MG/DL (ref 6–20)
BUN/CREAT SERPL: 9.8 (ref 7–25)
CALCIUM SPEC-SCNC: 9.2 MG/DL (ref 8.6–10.5)
CHLORIDE SERPL-SCNC: 95 MMOL/L (ref 98–107)
CO2 SERPL-SCNC: 25.5 MMOL/L (ref 22–29)
CREAT SERPL-MCNC: 0.82 MG/DL (ref 0.76–1.27)
DEPRECATED RDW RBC AUTO: 42 FL (ref 37–54)
EGFRCR SERPLBLD CKD-EPI 2021: 112.5 ML/MIN/1.73
EOSINOPHIL # BLD AUTO: 0.07 10*3/MM3 (ref 0–0.4)
EOSINOPHIL NFR BLD AUTO: 0.4 % (ref 0.3–6.2)
ERYTHROCYTE [DISTWIDTH] IN BLOOD BY AUTOMATED COUNT: 14.3 % (ref 12.3–15.4)
GLOBULIN UR ELPH-MCNC: 4.6 GM/DL
GLUCOSE SERPL-MCNC: 105 MG/DL (ref 65–99)
HCT VFR BLD AUTO: 42.6 % (ref 37.5–51)
HGB BLD-MCNC: 13.8 G/DL (ref 13–17.7)
IMM GRANULOCYTES # BLD AUTO: 0.46 10*3/MM3 (ref 0–0.05)
IMM GRANULOCYTES NFR BLD AUTO: 2.3 % (ref 0–0.5)
LYMPHOCYTES # BLD AUTO: 2.13 10*3/MM3 (ref 0.7–3.1)
LYMPHOCYTES NFR BLD AUTO: 10.9 % (ref 19.6–45.3)
MCH RBC QN AUTO: 26 PG (ref 26.6–33)
MCHC RBC AUTO-ENTMCNC: 32.4 G/DL (ref 31.5–35.7)
MCV RBC AUTO: 80.2 FL (ref 79–97)
MONOCYTES # BLD AUTO: 1.29 10*3/MM3 (ref 0.1–0.9)
MONOCYTES NFR BLD AUTO: 6.6 % (ref 5–12)
NEUTROPHILS NFR BLD AUTO: 15.61 10*3/MM3 (ref 1.7–7)
NEUTROPHILS NFR BLD AUTO: 79.4 % (ref 42.7–76)
NRBC BLD AUTO-RTO: 0 /100 WBC (ref 0–0.2)
PLATELET # BLD AUTO: 521 10*3/MM3 (ref 140–450)
PMV BLD AUTO: 8.3 FL (ref 6–12)
POTASSIUM SERPL-SCNC: 4.2 MMOL/L (ref 3.5–5.2)
PROT SERPL-MCNC: 8.5 G/DL (ref 6–8.5)
RBC # BLD AUTO: 5.31 10*6/MM3 (ref 4.14–5.8)
SODIUM SERPL-SCNC: 133 MMOL/L (ref 136–145)
WBC NRBC COR # BLD: 19.63 10*3/MM3 (ref 3.4–10.8)

## 2023-02-20 PROCEDURE — 25010000002 IOPAMIDOL 61 % SOLUTION: Performed by: STUDENT IN AN ORGANIZED HEALTH CARE EDUCATION/TRAINING PROGRAM

## 2023-02-20 PROCEDURE — 25010000002 ENOXAPARIN PER 10 MG: Performed by: INTERNAL MEDICINE

## 2023-02-20 PROCEDURE — 25010000002 MORPHINE PER 10 MG: Performed by: INTERNAL MEDICINE

## 2023-02-20 PROCEDURE — 99024 POSTOP FOLLOW-UP VISIT: CPT | Performed by: STUDENT IN AN ORGANIZED HEALTH CARE EDUCATION/TRAINING PROGRAM

## 2023-02-20 PROCEDURE — 85025 COMPLETE CBC W/AUTO DIFF WBC: CPT | Performed by: INTERNAL MEDICINE

## 2023-02-20 PROCEDURE — 25010000002 PIPERACILLIN SOD-TAZOBACTAM PER 1 G: Performed by: INTERNAL MEDICINE

## 2023-02-20 PROCEDURE — 80053 COMPREHEN METABOLIC PANEL: CPT | Performed by: INTERNAL MEDICINE

## 2023-02-20 PROCEDURE — 87040 BLOOD CULTURE FOR BACTERIA: CPT | Performed by: INTERNAL MEDICINE

## 2023-02-20 PROCEDURE — 99222 1ST HOSP IP/OBS MODERATE 55: CPT | Performed by: INTERNAL MEDICINE

## 2023-02-20 PROCEDURE — 74177 CT ABD & PELVIS W/CONTRAST: CPT

## 2023-02-20 PROCEDURE — 0 DIATRIZOATE MEGLUMINE & SODIUM PER 1 ML: Performed by: STUDENT IN AN ORGANIZED HEALTH CARE EDUCATION/TRAINING PROGRAM

## 2023-02-20 RX ORDER — SODIUM CHLORIDE 9 MG/ML
40 INJECTION, SOLUTION INTRAVENOUS AS NEEDED
Status: DISCONTINUED | OUTPATIENT
Start: 2023-02-20 | End: 2023-02-22 | Stop reason: HOSPADM

## 2023-02-20 RX ORDER — BISACODYL 5 MG/1
5 TABLET, DELAYED RELEASE ORAL DAILY PRN
Status: DISCONTINUED | OUTPATIENT
Start: 2023-02-20 | End: 2023-02-22 | Stop reason: HOSPADM

## 2023-02-20 RX ORDER — MORPHINE SULFATE 2 MG/ML
2 INJECTION, SOLUTION INTRAMUSCULAR; INTRAVENOUS EVERY 4 HOURS PRN
Status: DISCONTINUED | OUTPATIENT
Start: 2023-02-20 | End: 2023-02-22 | Stop reason: HOSPADM

## 2023-02-20 RX ORDER — ENOXAPARIN SODIUM 100 MG/ML
40 INJECTION SUBCUTANEOUS DAILY
Status: DISCONTINUED | OUTPATIENT
Start: 2023-02-20 | End: 2023-02-22 | Stop reason: HOSPADM

## 2023-02-20 RX ORDER — AMOXICILLIN 250 MG
2 CAPSULE ORAL 2 TIMES DAILY
Status: DISCONTINUED | OUTPATIENT
Start: 2023-02-20 | End: 2023-02-22 | Stop reason: HOSPADM

## 2023-02-20 RX ORDER — SODIUM CHLORIDE 0.9 % (FLUSH) 0.9 %
10 SYRINGE (ML) INJECTION AS NEEDED
Status: DISCONTINUED | OUTPATIENT
Start: 2023-02-20 | End: 2023-02-22 | Stop reason: HOSPADM

## 2023-02-20 RX ORDER — BISACODYL 10 MG
10 SUPPOSITORY, RECTAL RECTAL DAILY PRN
Status: DISCONTINUED | OUTPATIENT
Start: 2023-02-20 | End: 2023-02-22 | Stop reason: HOSPADM

## 2023-02-20 RX ORDER — SODIUM CHLORIDE 0.9 % (FLUSH) 0.9 %
10 SYRINGE (ML) INJECTION EVERY 12 HOURS SCHEDULED
Status: DISCONTINUED | OUTPATIENT
Start: 2023-02-20 | End: 2023-02-22 | Stop reason: HOSPADM

## 2023-02-20 RX ORDER — POLYETHYLENE GLYCOL 3350 17 G/17G
17 POWDER, FOR SOLUTION ORAL DAILY PRN
Status: DISCONTINUED | OUTPATIENT
Start: 2023-02-20 | End: 2023-02-22 | Stop reason: HOSPADM

## 2023-02-20 RX ORDER — ACETAMINOPHEN 650 MG/1
650 SUPPOSITORY RECTAL EVERY 4 HOURS PRN
Status: DISCONTINUED | OUTPATIENT
Start: 2023-02-20 | End: 2023-02-22 | Stop reason: HOSPADM

## 2023-02-20 RX ORDER — ACETAMINOPHEN 325 MG/1
650 TABLET ORAL EVERY 4 HOURS PRN
Status: DISCONTINUED | OUTPATIENT
Start: 2023-02-20 | End: 2023-02-22 | Stop reason: HOSPADM

## 2023-02-20 RX ORDER — ACETAMINOPHEN 160 MG/5ML
650 SOLUTION ORAL EVERY 4 HOURS PRN
Status: DISCONTINUED | OUTPATIENT
Start: 2023-02-20 | End: 2023-02-22 | Stop reason: HOSPADM

## 2023-02-20 RX ORDER — NALOXONE HCL 0.4 MG/ML
0.4 VIAL (ML) INJECTION
Status: DISCONTINUED | OUTPATIENT
Start: 2023-02-20 | End: 2023-02-22 | Stop reason: HOSPADM

## 2023-02-20 RX ORDER — CHOLECALCIFEROL (VITAMIN D3) 125 MCG
5 CAPSULE ORAL NIGHTLY PRN
Status: DISCONTINUED | OUTPATIENT
Start: 2023-02-20 | End: 2023-02-22 | Stop reason: HOSPADM

## 2023-02-20 RX ORDER — SODIUM CHLORIDE 9 MG/ML
75 INJECTION, SOLUTION INTRAVENOUS CONTINUOUS
Status: DISCONTINUED | OUTPATIENT
Start: 2023-02-20 | End: 2023-02-22 | Stop reason: HOSPADM

## 2023-02-20 RX ORDER — ONDANSETRON 2 MG/ML
4 INJECTION INTRAMUSCULAR; INTRAVENOUS EVERY 6 HOURS PRN
Status: DISCONTINUED | OUTPATIENT
Start: 2023-02-20 | End: 2023-02-22 | Stop reason: HOSPADM

## 2023-02-20 RX ADMIN — MORPHINE SULFATE 2 MG: 2 INJECTION, SOLUTION INTRAMUSCULAR; INTRAVENOUS at 22:24

## 2023-02-20 RX ADMIN — Medication 10 ML: at 22:41

## 2023-02-20 RX ADMIN — IOPAMIDOL 100 ML: 612 INJECTION, SOLUTION INTRAVENOUS at 17:03

## 2023-02-20 RX ADMIN — ENOXAPARIN SODIUM 40 MG: 100 INJECTION SUBCUTANEOUS at 22:39

## 2023-02-20 RX ADMIN — DIATRIZOATE MEGLUMINE AND DIATRIZOATE SODIUM 30 ML: 660; 100 LIQUID ORAL; RECTAL at 17:04

## 2023-02-20 RX ADMIN — TAZOBACTAM SODIUM AND PIPERACILLIN SODIUM 3.38 G: 375; 3 INJECTION, SOLUTION INTRAVENOUS at 22:37

## 2023-02-20 RX ADMIN — SODIUM CHLORIDE 75 ML/HR: 9 INJECTION, SOLUTION INTRAVENOUS at 22:38

## 2023-02-20 NOTE — TELEPHONE ENCOUNTER
I spoke to the patient he stated he has been having really sharp pains in his rib.chest area as well as running a high fever. I offered the patient to be scheduled with Dr. Mccray patient agreed.

## 2023-02-20 NOTE — PROGRESS NOTES
"Patient: Omar Gan    YOB: 1980    Date: 02/20/2023    Primary Care Provider: Jason Johnson MD    Chief Complaint   Patient presents with   • Post-op      laparoscopic appendectomy       History of present illness:  I saw the patient in the office today as a followup from their recent laparoscopic appendectomy done by Dr. Wyatt on 2/13/23. Pathology report did show sever acute appendicitis with periappendicitis. They state that they are having sharp pain that is \"breathtaking\" under right rib cage that started abruptly yesterday. Fever last night of 101.2. He has been having normal bowel movements and tolerating a regular diet. Denies nausea/vomiting.     Vital Signs:   Vitals:    02/20/23 1330   BP: 142/82   Pulse: 112   Resp: 18   Temp: 97.3 °F (36.3 °C)   TempSrc: Temporal   SpO2: 98%   Weight: 98.9 kg (218 lb)   Height: 182.9 cm (72\")       Physical Exam:   General Appearance:    Alert, cooperative, in no acute distress   Abdomen:     no masses, no organomegaly, mild distention, no guarding, wounds are well healed, pain to palpation in RUQ     Assessment / Plan:    1. Right upper quadrant abdominal pain        Patient is status post laparoscopic appendectomy on 2/13/23 with Dr. Wyatt. He had done relatively well last week after surgery, however, last night he did develop new severe RUQ pain and reports a fever of 101.2. He is tachycardic in the office today. I will order a CT abdomen/pelvis to rule out any acute intraabdominal processes. A work note was also provided to the patient today to be off work from 2/13/23-3/1/23. If no acute intraabdominal processes, I will order patient a muscle relaxant, but informed him I will not be giving him any more narcotic pain medications. He expresses understanding, all questions answered.       Electronically signed by Antonia Mccray DO  02/20/23                    "

## 2023-02-21 ENCOUNTER — APPOINTMENT (OUTPATIENT)
Dept: CT IMAGING | Facility: HOSPITAL | Age: 43
DRG: 862 | End: 2023-02-21

## 2023-02-21 LAB
ANION GAP SERPL CALCULATED.3IONS-SCNC: 10.5 MMOL/L (ref 5–15)
BASOPHILS # BLD AUTO: 0.07 10*3/MM3 (ref 0–0.2)
BASOPHILS NFR BLD AUTO: 0.4 % (ref 0–1.5)
BUN SERPL-MCNC: 9 MG/DL (ref 6–20)
BUN/CREAT SERPL: 10 (ref 7–25)
CALCIUM SPEC-SCNC: 9 MG/DL (ref 8.6–10.5)
CHLORIDE SERPL-SCNC: 99 MMOL/L (ref 98–107)
CO2 SERPL-SCNC: 25.5 MMOL/L (ref 22–29)
CREAT SERPL-MCNC: 0.9 MG/DL (ref 0.76–1.27)
DEPRECATED RDW RBC AUTO: 44.4 FL (ref 37–54)
EGFRCR SERPLBLD CKD-EPI 2021: 109.4 ML/MIN/1.73
EOSINOPHIL # BLD AUTO: 0.1 10*3/MM3 (ref 0–0.4)
EOSINOPHIL NFR BLD AUTO: 0.6 % (ref 0.3–6.2)
ERYTHROCYTE [DISTWIDTH] IN BLOOD BY AUTOMATED COUNT: 14.5 % (ref 12.3–15.4)
GLUCOSE SERPL-MCNC: 106 MG/DL (ref 65–99)
HCT VFR BLD AUTO: 38.2 % (ref 37.5–51)
HGB BLD-MCNC: 12.2 G/DL (ref 13–17.7)
IMM GRANULOCYTES # BLD AUTO: 0.48 10*3/MM3 (ref 0–0.05)
IMM GRANULOCYTES NFR BLD AUTO: 2.7 % (ref 0–0.5)
LYMPHOCYTES # BLD AUTO: 2.34 10*3/MM3 (ref 0.7–3.1)
LYMPHOCYTES NFR BLD AUTO: 13.3 % (ref 19.6–45.3)
MCH RBC QN AUTO: 26.9 PG (ref 26.6–33)
MCHC RBC AUTO-ENTMCNC: 31.9 G/DL (ref 31.5–35.7)
MCV RBC AUTO: 84.1 FL (ref 79–97)
MONOCYTES # BLD AUTO: 1.14 10*3/MM3 (ref 0.1–0.9)
MONOCYTES NFR BLD AUTO: 6.5 % (ref 5–12)
NEUTROPHILS NFR BLD AUTO: 13.43 10*3/MM3 (ref 1.7–7)
NEUTROPHILS NFR BLD AUTO: 76.5 % (ref 42.7–76)
NRBC BLD AUTO-RTO: 0 /100 WBC (ref 0–0.2)
PLATELET # BLD AUTO: 473 10*3/MM3 (ref 140–450)
PMV BLD AUTO: 8.4 FL (ref 6–12)
POTASSIUM SERPL-SCNC: 4.3 MMOL/L (ref 3.5–5.2)
PROCALCITONIN SERPL-MCNC: 0.2 NG/ML (ref 0–0.25)
RBC # BLD AUTO: 4.54 10*6/MM3 (ref 4.14–5.8)
SODIUM SERPL-SCNC: 135 MMOL/L (ref 136–145)
WBC NRBC COR # BLD: 17.56 10*3/MM3 (ref 3.4–10.8)

## 2023-02-21 PROCEDURE — 87015 SPECIMEN INFECT AGNT CONCNTJ: CPT | Performed by: INTERNAL MEDICINE

## 2023-02-21 PROCEDURE — 87075 CULTR BACTERIA EXCEPT BLOOD: CPT | Performed by: INTERNAL MEDICINE

## 2023-02-21 PROCEDURE — 49406 IMAGE CATH FLUID PERI/RETRO: CPT

## 2023-02-21 PROCEDURE — 25010000002 ENOXAPARIN PER 10 MG: Performed by: INTERNAL MEDICINE

## 2023-02-21 PROCEDURE — 85025 COMPLETE CBC W/AUTO DIFF WBC: CPT | Performed by: INTERNAL MEDICINE

## 2023-02-21 PROCEDURE — 87070 CULTURE OTHR SPECIMN AEROBIC: CPT | Performed by: INTERNAL MEDICINE

## 2023-02-21 PROCEDURE — 99024 POSTOP FOLLOW-UP VISIT: CPT | Performed by: SURGERY

## 2023-02-21 PROCEDURE — 0W9F30Z DRAINAGE OF ABDOMINAL WALL WITH DRAINAGE DEVICE, PERCUTANEOUS APPROACH: ICD-10-PCS | Performed by: RADIOLOGY

## 2023-02-21 PROCEDURE — 87102 FUNGUS ISOLATION CULTURE: CPT | Performed by: INTERNAL MEDICINE

## 2023-02-21 PROCEDURE — 87205 SMEAR GRAM STAIN: CPT | Performed by: INTERNAL MEDICINE

## 2023-02-21 PROCEDURE — 25010000002 FENTANYL CITRATE (PF) 50 MCG/ML SOLUTION: Performed by: RADIOLOGY

## 2023-02-21 PROCEDURE — 25010000002 MIDAZOLAM PER 1 MG: Performed by: RADIOLOGY

## 2023-02-21 PROCEDURE — 25010000002 MORPHINE PER 10 MG: Performed by: INTERNAL MEDICINE

## 2023-02-21 PROCEDURE — 87147 CULTURE TYPE IMMUNOLOGIC: CPT | Performed by: INTERNAL MEDICINE

## 2023-02-21 PROCEDURE — 99232 SBSQ HOSP IP/OBS MODERATE 35: CPT | Performed by: NURSE PRACTITIONER

## 2023-02-21 PROCEDURE — 80048 BASIC METABOLIC PNL TOTAL CA: CPT | Performed by: INTERNAL MEDICINE

## 2023-02-21 PROCEDURE — 84145 PROCALCITONIN (PCT): CPT | Performed by: INTERNAL MEDICINE

## 2023-02-21 PROCEDURE — 75989 ABSCESS DRAINAGE UNDER X-RAY: CPT

## 2023-02-21 PROCEDURE — 87186 SC STD MICRODIL/AGAR DIL: CPT | Performed by: INTERNAL MEDICINE

## 2023-02-21 PROCEDURE — 25010000002 PIPERACILLIN SOD-TAZOBACTAM PER 1 G: Performed by: INTERNAL MEDICINE

## 2023-02-21 RX ORDER — ONDANSETRON 2 MG/ML
4 INJECTION INTRAMUSCULAR; INTRAVENOUS ONCE AS NEEDED
Status: DISCONTINUED | OUTPATIENT
Start: 2023-02-21 | End: 2023-02-21

## 2023-02-21 RX ORDER — MIDAZOLAM HYDROCHLORIDE 1 MG/ML
INJECTION INTRAMUSCULAR; INTRAVENOUS AS NEEDED
Status: COMPLETED | OUTPATIENT
Start: 2023-02-21 | End: 2023-02-21

## 2023-02-21 RX ORDER — FENTANYL CITRATE 50 UG/ML
INJECTION, SOLUTION INTRAMUSCULAR; INTRAVENOUS AS NEEDED
Status: COMPLETED | OUTPATIENT
Start: 2023-02-21 | End: 2023-02-21

## 2023-02-21 RX ADMIN — ACETAMINOPHEN 650 MG: 325 TABLET, FILM COATED ORAL at 03:20

## 2023-02-21 RX ADMIN — MIDAZOLAM HYDROCHLORIDE 2 MG: 1 INJECTION, SOLUTION INTRAMUSCULAR; INTRAVENOUS at 14:15

## 2023-02-21 RX ADMIN — TAZOBACTAM SODIUM AND PIPERACILLIN SODIUM 3.38 G: 375; 3 INJECTION, SOLUTION INTRAVENOUS at 21:32

## 2023-02-21 RX ADMIN — ENOXAPARIN SODIUM 40 MG: 100 INJECTION SUBCUTANEOUS at 21:31

## 2023-02-21 RX ADMIN — MORPHINE SULFATE 2 MG: 2 INJECTION, SOLUTION INTRAMUSCULAR; INTRAVENOUS at 09:43

## 2023-02-21 RX ADMIN — MIDAZOLAM HYDROCHLORIDE 2 MG: 1 INJECTION, SOLUTION INTRAMUSCULAR; INTRAVENOUS at 14:08

## 2023-02-21 RX ADMIN — MORPHINE SULFATE 2 MG: 2 INJECTION, SOLUTION INTRAMUSCULAR; INTRAVENOUS at 16:28

## 2023-02-21 RX ADMIN — MORPHINE SULFATE 2 MG: 2 INJECTION, SOLUTION INTRAMUSCULAR; INTRAVENOUS at 21:31

## 2023-02-21 RX ADMIN — TAZOBACTAM SODIUM AND PIPERACILLIN SODIUM 3.38 G: 375; 3 INJECTION, SOLUTION INTRAVENOUS at 03:21

## 2023-02-21 RX ADMIN — Medication 10 ML: at 21:36

## 2023-02-21 RX ADMIN — TAZOBACTAM SODIUM AND PIPERACILLIN SODIUM 3.38 G: 375; 3 INJECTION, SOLUTION INTRAVENOUS at 12:05

## 2023-02-21 RX ADMIN — FENTANYL CITRATE 100 MCG: 50 INJECTION INTRAMUSCULAR; INTRAVENOUS at 14:09

## 2023-02-22 VITALS
RESPIRATION RATE: 18 BRPM | DIASTOLIC BLOOD PRESSURE: 80 MMHG | HEART RATE: 75 BPM | OXYGEN SATURATION: 94 % | SYSTOLIC BLOOD PRESSURE: 122 MMHG | TEMPERATURE: 97.5 F

## 2023-02-22 LAB
ANION GAP SERPL CALCULATED.3IONS-SCNC: 9.4 MMOL/L (ref 5–15)
BUN SERPL-MCNC: 9 MG/DL (ref 6–20)
BUN/CREAT SERPL: 11 (ref 7–25)
CALCIUM SPEC-SCNC: 8.8 MG/DL (ref 8.6–10.5)
CHLORIDE SERPL-SCNC: 99 MMOL/L (ref 98–107)
CO2 SERPL-SCNC: 26.6 MMOL/L (ref 22–29)
CREAT SERPL-MCNC: 0.82 MG/DL (ref 0.76–1.27)
DEPRECATED RDW RBC AUTO: 42.9 FL (ref 37–54)
EGFRCR SERPLBLD CKD-EPI 2021: 112.5 ML/MIN/1.73
ERYTHROCYTE [DISTWIDTH] IN BLOOD BY AUTOMATED COUNT: 14.4 % (ref 12.3–15.4)
GLUCOSE SERPL-MCNC: 100 MG/DL (ref 65–99)
HCT VFR BLD AUTO: 40.1 % (ref 37.5–51)
HGB BLD-MCNC: 12.7 G/DL (ref 13–17.7)
MCH RBC QN AUTO: 26 PG (ref 26.6–33)
MCHC RBC AUTO-ENTMCNC: 31.7 G/DL (ref 31.5–35.7)
MCV RBC AUTO: 82.2 FL (ref 79–97)
PLATELET # BLD AUTO: 566 10*3/MM3 (ref 140–450)
PMV BLD AUTO: 8.2 FL (ref 6–12)
POTASSIUM SERPL-SCNC: 4.4 MMOL/L (ref 3.5–5.2)
RBC # BLD AUTO: 4.88 10*6/MM3 (ref 4.14–5.8)
SODIUM SERPL-SCNC: 135 MMOL/L (ref 136–145)
WBC NRBC COR # BLD: 9.68 10*3/MM3 (ref 3.4–10.8)

## 2023-02-22 PROCEDURE — 99238 HOSP IP/OBS DSCHRG MGMT 30/<: CPT | Performed by: NURSE PRACTITIONER

## 2023-02-22 PROCEDURE — 85027 COMPLETE CBC AUTOMATED: CPT | Performed by: NURSE PRACTITIONER

## 2023-02-22 PROCEDURE — 99024 POSTOP FOLLOW-UP VISIT: CPT | Performed by: SURGERY

## 2023-02-22 PROCEDURE — 80048 BASIC METABOLIC PNL TOTAL CA: CPT | Performed by: NURSE PRACTITIONER

## 2023-02-22 PROCEDURE — 25010000002 PIPERACILLIN SOD-TAZOBACTAM PER 1 G: Performed by: INTERNAL MEDICINE

## 2023-02-22 RX ORDER — AMOXICILLIN AND CLAVULANATE POTASSIUM 875; 125 MG/1; MG/1
1 TABLET, FILM COATED ORAL 2 TIMES DAILY
Qty: 16 TABLET | Refills: 0 | Status: SHIPPED | OUTPATIENT
Start: 2023-02-22 | End: 2023-03-02

## 2023-02-22 RX ADMIN — TAZOBACTAM SODIUM AND PIPERACILLIN SODIUM 3.38 G: 375; 3 INJECTION, SOLUTION INTRAVENOUS at 04:50

## 2023-02-22 RX ADMIN — SENNOSIDES AND DOCUSATE SODIUM 2 TABLET: 50; 8.6 TABLET ORAL at 09:03

## 2023-02-22 RX ADMIN — Medication 10 ML: at 09:03

## 2023-02-22 RX ADMIN — ACETAMINOPHEN 650 MG: 325 TABLET, FILM COATED ORAL at 09:03

## 2023-02-22 RX ADMIN — ACETAMINOPHEN 650 MG: 325 TABLET, FILM COATED ORAL at 02:25

## 2023-02-24 LAB
BACTERIA FLD CULT: ABNORMAL
GRAM STN SPEC: ABNORMAL
GRAM STN SPEC: ABNORMAL
REF LAB TEST METHOD: NORMAL

## 2023-02-25 LAB
BACTERIA SPEC AEROBE CULT: NORMAL
BACTERIA SPEC AEROBE CULT: NORMAL

## 2023-02-26 LAB — BACTERIA SPEC ANAEROBE CULT: NORMAL

## 2023-02-28 ENCOUNTER — OFFICE VISIT (OUTPATIENT)
Dept: SURGERY | Facility: CLINIC | Age: 43
End: 2023-02-28

## 2023-02-28 VITALS
WEIGHT: 215 LBS | TEMPERATURE: 97.6 F | SYSTOLIC BLOOD PRESSURE: 120 MMHG | OXYGEN SATURATION: 95 % | BODY MASS INDEX: 29.16 KG/M2 | HEART RATE: 85 BPM | DIASTOLIC BLOOD PRESSURE: 82 MMHG

## 2023-02-28 DIAGNOSIS — Z48.89 POSTOPERATIVE VISIT: Primary | ICD-10-CM

## 2023-02-28 PROCEDURE — 99024 POSTOP FOLLOW-UP VISIT: CPT | Performed by: SURGERY

## 2023-02-28 RX ORDER — SULFAMETHOXAZOLE AND TRIMETHOPRIM 800; 160 MG/1; MG/1
1 TABLET ORAL 2 TIMES DAILY
Qty: 6 TABLET | Refills: 0 | Status: SHIPPED | OUTPATIENT
Start: 2023-02-28 | End: 2023-03-05

## 2023-02-28 NOTE — PROGRESS NOTES
Patient: Omar Gan  YOB: 1980    Date: 02/28/2023    Primary Care Provider: Jason Johnson MD    Chief Complaint   Patient presents with   • Post-op     Post-op appendectomy        History: Patient states that he is feeling much better.  Having no current complaints.  No fever or chills.    The following portions of the patient's history were reviewed and updated as appropriate: allergies, current medications, past family history, past medical history, past social history, past surgical history and problem list.    Review of Systems   Constitutional: Negative for fatigue and fever.   Gastrointestinal: Positive for abdominal pain. Negative for constipation, diarrhea, nausea and vomiting.   Genitourinary: Positive for frequency. Negative for dysuria and hematuria.   Musculoskeletal: Negative for arthralgias and myalgias.   Neurological: Negative for headaches.       Vital Signs  Vitals:    02/28/23 1049   BP: 120/82   Pulse: 85   Temp: 97.6 °F (36.4 °C)   SpO2: 95%   Weight: 97.5 kg (215 lb)       Allergies:  No Known Allergies    Medications:    Current Outpatient Medications:   •  amoxicillin-clavulanate (Augmentin) 875-125 MG per tablet, Take 1 tablet by mouth 2 (Two) Times a Day for 8 days., Disp: 16 tablet, Rfl: 0  •  HYDROcodone-acetaminophen (Norco) 7.5-325 MG per tablet, Take 1 tablet by mouth Every 6 (Six) Hours As Needed for Moderate Pain., Disp: 15 tablet, Rfl: 0  •  ondansetron (Zofran) 4 MG tablet, Take 1 tablet by mouth Daily As Needed for Nausea or Vomiting., Disp: 10 tablet, Rfl: 1  •  sulfamethoxazole-trimethoprim (Bactrim DS) 800-160 MG per tablet, Take 1 tablet by mouth 2 (Two) Times a Day for 5 days., Disp: 6 tablet, Rfl: 0    Physical Exam:   General Appearance:    Alert, cooperative, in no acute distress   Abdomen:    Soft and nontender nondistended.  Drain with minimal serous drainage.  I removed this today.   Results Review:   None     Review of Systems was reviewed  and confirmed as accurate as documented by the MA.    ASSESSMENT/PLAN:    1. Postoperative visit       Patient overall doing well.  Having no issues.  Activity instructions were given.  I will have him follow-up as needed.    Electronically signed by Mayito Wyatt MD  02/28/23

## 2023-03-01 RX ORDER — SULFAMETHOXAZOLE AND TRIMETHOPRIM 800; 160 MG/1; MG/1
1 TABLET ORAL 2 TIMES DAILY
Qty: 10 TABLET | Refills: 0 | Status: SHIPPED | OUTPATIENT
Start: 2023-03-01

## 2023-03-22 LAB
FUNGUS SPEC CULT: NORMAL
FUNGUS SPEC FUNGUS STN: NORMAL

## 2024-07-11 RX ORDER — MELOXICAM 15 MG/1
15 TABLET ORAL DAILY
COMMUNITY
End: 2024-07-15

## 2024-07-15 ENCOUNTER — OFFICE VISIT (OUTPATIENT)
Dept: GASTROENTEROLOGY | Facility: CLINIC | Age: 44
End: 2024-07-15
Payer: COMMERCIAL

## 2024-07-15 ENCOUNTER — LAB (OUTPATIENT)
Dept: LAB | Facility: HOSPITAL | Age: 44
End: 2024-07-15
Payer: COMMERCIAL

## 2024-07-15 VITALS
SYSTOLIC BLOOD PRESSURE: 120 MMHG | OXYGEN SATURATION: 97 % | HEART RATE: 75 BPM | DIASTOLIC BLOOD PRESSURE: 84 MMHG | WEIGHT: 228 LBS | BODY MASS INDEX: 30.92 KG/M2

## 2024-07-15 DIAGNOSIS — L40.9 PSORIASIS: ICD-10-CM

## 2024-07-15 DIAGNOSIS — R10.9 ABDOMINAL PAIN, UNSPECIFIED ABDOMINAL LOCATION: Primary | ICD-10-CM

## 2024-07-15 DIAGNOSIS — R79.89 ABNORMAL LFTS: ICD-10-CM

## 2024-07-15 LAB
ALBUMIN SERPL-MCNC: 4.8 G/DL (ref 3.5–5.2)
ALP SERPL-CCNC: 75 U/L (ref 39–117)
ALT SERPL W P-5'-P-CCNC: 113 U/L (ref 1–41)
AST SERPL-CCNC: 44 U/L (ref 1–40)
BILIRUB CONJ SERPL-MCNC: 0.2 MG/DL (ref 0–0.3)
BILIRUB INDIRECT SERPL-MCNC: 1 MG/DL
BILIRUB SERPL-MCNC: 1.2 MG/DL (ref 0–1.2)
PROT SERPL-MCNC: 7.9 G/DL (ref 6–8.5)

## 2024-07-15 PROCEDURE — 99204 OFFICE O/P NEW MOD 45 MIN: CPT | Performed by: INTERNAL MEDICINE

## 2024-07-15 PROCEDURE — 36415 COLL VENOUS BLD VENIPUNCTURE: CPT | Performed by: INTERNAL MEDICINE

## 2024-07-15 PROCEDURE — 80076 HEPATIC FUNCTION PANEL: CPT | Performed by: INTERNAL MEDICINE

## 2024-07-15 RX ORDER — PREDNISONE 10 MG/1
10 TABLET ORAL DAILY
COMMUNITY

## 2024-07-15 RX ORDER — LEFLUNOMIDE 10 MG/1
10 TABLET ORAL DAILY
COMMUNITY

## 2024-07-15 NOTE — PROGRESS NOTES
New Patient Consult      Date: 07/15/2024   Patient Name: Omar Gan  MRN: [id number]  : 1980     Referring Physician: Nika Burk*    Chief Complaint   Patient presents with    Elevated Hepatic Enzymes       History of Present Illness: Omar Gan is a 44 y.o. male who is here today to establish care with Gastroenterology.    The referral was sent for abnormal LFTs.  I am not able to review any recent LFTs on file.  Most of the available testing is from a year ago, when he had a complicated course with appendicitis.    He underwent an appendectomy, and unfortunately subsequent to that he developed a abscess, which was drained using IR.  The drain was subsequently removed as an outpatient.  He has had a relatively uneventful course since then, but he does complain of some discomfort in the right upper abdomen.  That is where the drain was.  He feels that there is sometimes a sharp pain when he takes a deep breath, and it bothers him if he is running or jogging or working out.    He also has a history of psoriasis, and is on leflunomide.  Review of the medication indicates that there is potential for hepatotoxicity.    There is no jaundice.  On abdominal exam he is not particularly tender to palpation.    Subjective      Past Medical History:   Diagnosis Date    Allergic rhinitis     Asthma     HSV-2 (herpes simplex virus 2) infection     Psoriasis        Past Surgical History:   Procedure Laterality Date    APPENDECTOMY N/A 2023    Procedure: APPENDECTOMY LAPAROSCOPIC;  Surgeon: Mayito Wyatt MD;  Location: [facility];  Service: General;  Laterality: N/A;    HERNIA REPAIR         Family History   Problem Relation Age of Onset    Asthma Mother        Social History     Socioeconomic History    Marital status:    Tobacco Use    Smoking status: Former     Current packs/day: 0.00     Types: Cigarettes     Start date: 4/10/1998     Quit date: 5/15/1999     Years since  quittin.1    Smokeless tobacco: Never   Vaping Use    Vaping status: Never Used   Substance and Sexual Activity    Alcohol use: Not Currently     Alcohol/week: 5.0 standard drinks of alcohol     Types: 5 Cans of beer per week    Drug use: Never    Sexual activity: Not Currently     Partners: Female     Birth control/protection: Pill         Current Outpatient Medications:     leflunomide (ARAVA) 10 MG tablet, Take 1 tablet by mouth Daily., Disp: , Rfl:     predniSONE (DELTASONE) 10 MG tablet, Take 1 tablet by mouth Daily., Disp: , Rfl:     No Known Allergies    Review of Systems    The following portions of the patient's history were reviewed and updated as appropriate: allergies, current medications, past family history, past medical history, past social history, past surgical history and problem list.    Objective     Physical Exam:  Vitals:    07/15/24 1009   BP: 120/84   Pulse: 75   SpO2: 97%   Weight: 103 kg (228 lb)       Physical Exam  Constitutional:       General: He is not in acute distress.     Appearance: Normal appearance.   HENT:      Head: Normocephalic and atraumatic.      Mouth/Throat:      Mouth: Mucous membranes are moist.   Eyes:      General: No scleral icterus.     Conjunctiva/sclera: Conjunctivae normal.   Cardiovascular:      Rate and Rhythm: Normal rate.   Pulmonary:      Effort: Pulmonary effort is normal. No respiratory distress.   Abdominal:      General: There is no distension.      Tenderness: There is no abdominal tenderness.   Musculoskeletal:         General: No deformity or signs of injury.   Skin:     Coloration: Skin is not jaundiced or pale.   Neurological:      General: No focal deficit present.      Mental Status: He is alert and oriented to person, place, and time.   Psychiatric:         Mood and Affect: Mood normal.         Behavior: Behavior normal.         Results Review:   I have reviewed the patient's new clinical and imaging results and agree with the  interpretation.     No visits with results within 90 Day(s) from this visit.   Latest known visit with results is:   Admission on 02/20/2023, Discharged on 02/22/2023   Component Date Value Ref Range Status    Glucose 02/20/2023 105 (H)  65 - 99 mg/dL Final    BUN 02/20/2023 8  6 - 20 mg/dL Final    Creatinine 02/20/2023 0.82  0.76 - 1.27 mg/dL Final    Sodium 02/20/2023 133 (L)  136 - 145 mmol/L Final    Potassium 02/20/2023 4.2  3.5 - 5.2 mmol/L Final    Chloride 02/20/2023 95 (L)  98 - 107 mmol/L Final    CO2 02/20/2023 25.5  22.0 - 29.0 mmol/L Final    Calcium 02/20/2023 9.2  8.6 - 10.5 mg/dL Final    Total Protein 02/20/2023 8.5  6.0 - 8.5 g/dL Final    Albumin 02/20/2023 3.9  3.5 - 5.2 g/dL Final    ALT (SGPT) 02/20/2023 72 (H)  1 - 41 U/L Final    AST (SGOT) 02/20/2023 27  1 - 40 U/L Final    Alkaline Phosphatase 02/20/2023 131 (H)  39 - 117 U/L Final    Total Bilirubin 02/20/2023 1.0  0.0 - 1.2 mg/dL Final    Globulin 02/20/2023 4.6  gm/dL Final    A/G Ratio 02/20/2023 0.8  g/dL Final    BUN/Creatinine Ratio 02/20/2023 9.8  7.0 - 25.0 Final    Anion Gap 02/20/2023 12.5  5.0 - 15.0 mmol/L Final    eGFR 02/20/2023 112.5  >60.0 mL/min/1.73 Final    WBC 02/20/2023 19.63 (H)  3.40 - 10.80 10*3/mm3 Final    RBC 02/20/2023 5.31  4.14 - 5.80 10*6/mm3 Final    Hemoglobin 02/20/2023 13.8  13.0 - 17.7 g/dL Final    Hematocrit 02/20/2023 42.6  37.5 - 51.0 % Final    MCV 02/20/2023 80.2  79.0 - 97.0 fL Final    MCH 02/20/2023 26.0 (L)  26.6 - 33.0 pg Final    MCHC 02/20/2023 32.4  31.5 - 35.7 g/dL Final    RDW 02/20/2023 14.3  12.3 - 15.4 % Final    RDW-SD 02/20/2023 42.0  37.0 - 54.0 fl Final    MPV 02/20/2023 8.3  6.0 - 12.0 fL Final    Platelets 02/20/2023 521 (H)  140 - 450 10*3/mm3 Final    Neutrophil % 02/20/2023 79.4 (H)  42.7 - 76.0 % Final    Lymphocyte % 02/20/2023 10.9 (L)  19.6 - 45.3 % Final    Monocyte % 02/20/2023 6.6  5.0 - 12.0 % Final    Eosinophil % 02/20/2023 0.4  0.3 - 6.2 % Final    Basophil %  02/20/2023 0.4  0.0 - 1.5 % Final    Immature Grans % 02/20/2023 2.3 (H)  0.0 - 0.5 % Final    Neutrophils, Absolute 02/20/2023 15.61 (H)  1.70 - 7.00 10*3/mm3 Final    Lymphocytes, Absolute 02/20/2023 2.13  0.70 - 3.10 10*3/mm3 Final    Monocytes, Absolute 02/20/2023 1.29 (H)  0.10 - 0.90 10*3/mm3 Final    Eosinophils, Absolute 02/20/2023 0.07  0.00 - 0.40 10*3/mm3 Final    Basophils, Absolute 02/20/2023 0.07  0.00 - 0.20 10*3/mm3 Final    Immature Grans, Absolute 02/20/2023 0.46 (H)  0.00 - 0.05 10*3/mm3 Final    nRBC 02/20/2023 0.0  0.0 - 0.2 /100 WBC Final    Blood Culture 02/20/2023 No growth at 5 days   Final    Blood Culture 02/20/2023 No growth at 5 days   Final    Glucose 02/21/2023 106 (H)  65 - 99 mg/dL Final    BUN 02/21/2023 9  6 - 20 mg/dL Final    Creatinine 02/21/2023 0.90  0.76 - 1.27 mg/dL Final    Sodium 02/21/2023 135 (L)  136 - 145 mmol/L Final    Potassium 02/21/2023 4.3  3.5 - 5.2 mmol/L Final    Chloride 02/21/2023 99  98 - 107 mmol/L Final    CO2 02/21/2023 25.5  22.0 - 29.0 mmol/L Final    Calcium 02/21/2023 9.0  8.6 - 10.5 mg/dL Final    BUN/Creatinine Ratio 02/21/2023 10.0  7.0 - 25.0 Final    Anion Gap 02/21/2023 10.5  5.0 - 15.0 mmol/L Final    eGFR 02/21/2023 109.4  >60.0 mL/min/1.73 Final    WBC 02/21/2023 17.56 (H)  3.40 - 10.80 10*3/mm3 Final    RBC 02/21/2023 4.54  4.14 - 5.80 10*6/mm3 Final    Hemoglobin 02/21/2023 12.2 (L)  13.0 - 17.7 g/dL Final    Hematocrit 02/21/2023 38.2  37.5 - 51.0 % Final    MCV 02/21/2023 84.1  79.0 - 97.0 fL Final    MCH 02/21/2023 26.9  26.6 - 33.0 pg Final    MCHC 02/21/2023 31.9  31.5 - 35.7 g/dL Final    RDW 02/21/2023 14.5  12.3 - 15.4 % Final    RDW-SD 02/21/2023 44.4  37.0 - 54.0 fl Final    MPV 02/21/2023 8.4  6.0 - 12.0 fL Final    Platelets 02/21/2023 473 (H)  140 - 450 10*3/mm3 Final    Neutrophil % 02/21/2023 76.5 (H)  42.7 - 76.0 % Final    Lymphocyte % 02/21/2023 13.3 (L)  19.6 - 45.3 % Final    Monocyte % 02/21/2023 6.5  5.0 - 12.0 %  Final    Eosinophil % 2023 0.6  0.3 - 6.2 % Final    Basophil % 2023 0.4  0.0 - 1.5 % Final    Immature Grans % 2023 2.7 (H)  0.0 - 0.5 % Final    Neutrophils, Absolute 2023 13.43 (H)  1.70 - 7.00 10*3/mm3 Final    Lymphocytes, Absolute 2023 2.34  0.70 - 3.10 10*3/mm3 Final    Monocytes, Absolute 2023 1.14 (H)  0.10 - 0.90 10*3/mm3 Final    Eosinophils, Absolute 2023 0.10  0.00 - 0.40 10*3/mm3 Final    Basophils, Absolute 2023 0.07  0.00 - 0.20 10*3/mm3 Final    Immature Grans, Absolute 2023 0.48 (H)  0.00 - 0.05 10*3/mm3 Final    nRBC 2023 0.0  0.0 - 0.2 /100 WBC Final    Procalcitonin 2023 0.20  0.00 - 0.25 ng/mL Final    Reference Lab Report 2023    Final                    Value:[facility]  [address] [place] [address]  Phone: [phone] or [phone]  Fax: [phone]  Emerson Villatoro M.D., Medical Director    PATIENT NAME                        LABORATORY NO.  SHIRA HAWKINS                       [id number]  [id number]                           AGE            SEX   N         CLIENT REF #  [place] [id number]     1980      [id number]  [address]                  REQUESTING M.D.   ATTENDING M.D..   COPY TO..   [address] [place] [address]  DATE COLLECTED    DATE RECEIVED     DATE REPORTED  2023    DIAGNOSIS:  ABDOMINAL FLUID:  Negative for malignant cells.    MICROSCOPIC DESCRIPTION:  Scattered mesothelial cells are present with abundant acute inflammatory cells  within a serous background.    Professional interpretation rendered by Sandra Friedman D.O.,                           F.C.A.P. at [facility], [address] [place] [address].    CLINICAL HISTORY:  Abscess after procedure    SPECIMENS SUBMITTED:  ABDOMINAL  FLUID    GROSS SPECIMEN DESCRIPTION:  30cc of very cloudy pale yellow fluid with sediment in fixative  RPMI received  Cell block has been examined.    FLOW CYTOMETRY:  Interpretation: Inadequate for analysis due to absence of CD45 positive events. No  charge made for professional interpretation.    The following antibodies were tested: CD2, CD3, CD4, CD5, CD7, CD8, CD10,  CD11b, CD19, CD20, CD23, CD30, CD38, CD43, CD45, CD56, CD57, FMC-7,  HLA-DR, kappa, lambda. Technical component performed at [facility]  [place], [place]. The interpretation is performed by Dr. Richmond Coker at  [facility] (see above for address). These tests use analyte  specific reagents. The performance of these analyte specific reagents was  determined by [facility]. These tests have not been cleared or approved  by the U.S. Food &                           Drug Administration (FDA). The FDA has determined that  such approval is not necessary. These tests are used for clinical purposes and  should not be considered experimental or research.    REVIEWED, DIAGNOSED AND ELECTRONICALLY  SIGNED BY:    Sandra Friedman D.O., F.C.A.P.  CPT CODES:  11405, 98460      Body Fluid Culture 02/21/2023 Light growth (2+) Escherichia coli (A)   Final    Body Fluid Culture 02/21/2023 Light growth (2+) Proteus mirabilis (A)   Final    Body Fluid Culture 02/21/2023 Scant growth (1+) Streptococcus, Beta Hemolytic, Group C (A)   Final      This organism is considered to be universally susceptible to penicillin.  No further antibiotic testing will be performed.    Gram Stain 02/21/2023 Many (4+) Gram positive cocci in pairs and chains   Final    Gram Stain 02/21/2023 Moderate (3+) WBCs per low power field   Final    Anaerobic Culture 02/21/2023 No anaerobes isolated at 5 days   Final    Fungus Stain 02/21/2023 Final report   Final    KOH/Calcofluor preparation 02/21/2023 Comment   Final    KOH/Calcofluor preparation:  no fungus  observed.    Culture 02/21/2023 Final report   Final    Fungus (Mycology) Result 1 02/21/2023 Comment   Final    No yeast or mold isolated after 4 weeks.    WBC 02/22/2023 9.68  3.40 - 10.80 10*3/mm3 Final    RBC 02/22/2023 4.88  4.14 - 5.80 10*6/mm3 Final    Hemoglobin 02/22/2023 12.7 (L)  13.0 - 17.7 g/dL Final    Hematocrit 02/22/2023 40.1  37.5 - 51.0 % Final    MCV 02/22/2023 82.2  79.0 - 97.0 fL Final    MCH 02/22/2023 26.0 (L)  26.6 - 33.0 pg Final    MCHC 02/22/2023 31.7  31.5 - 35.7 g/dL Final    RDW 02/22/2023 14.4  12.3 - 15.4 % Final    RDW-SD 02/22/2023 42.9  37.0 - 54.0 fl Final    MPV 02/22/2023 8.2  6.0 - 12.0 fL Final    Platelets 02/22/2023 566 (H)  140 - 450 10*3/mm3 Final    Glucose 02/22/2023 100 (H)  65 - 99 mg/dL Final    BUN 02/22/2023 9  6 - 20 mg/dL Final    Creatinine 02/22/2023 0.82  0.76 - 1.27 mg/dL Final    Sodium 02/22/2023 135 (L)  136 - 145 mmol/L Final    Potassium 02/22/2023 4.4  3.5 - 5.2 mmol/L Final    Chloride 02/22/2023 99  98 - 107 mmol/L Final    CO2 02/22/2023 26.6  22.0 - 29.0 mmol/L Final    Calcium 02/22/2023 8.8  8.6 - 10.5 mg/dL Final    BUN/Creatinine Ratio 02/22/2023 11.0  7.0 - 25.0 Final    Anion Gap 02/22/2023 9.4  5.0 - 15.0 mmol/L Final    eGFR 02/22/2023 112.5  >60.0 mL/min/1.73 Final      No radiology results for the last 90 days.    Assessment / Plan      Assessment & Plan:  Diagnoses and all orders for this visit:    1. Abdominal pain, unspecified abdominal location (Primary)  -     Soluble Liver Ag (IgG Ab); Future  -     Liver Cytosol Type 1 Abs; Future  -     Mitochondrial Antibodies, M2  -     Anti-Smooth Muscle Antibody Titer; Future  -     Ceruloplasmin; Future  -     Alpha - 1 - Antitrypsin  -     Hepatic Function Panel  -     CT Abdomen Pelvis With Contrast; Future    2. Abnormal LFTs  -     Soluble Liver Ag (IgG Ab); Future  -     Liver Cytosol Type 1 Abs; Future  -     Mitochondrial Antibodies, M2  -     Anti-Smooth Muscle Antibody Titer;  Future  -     Ceruloplasmin; Future  -     Alpha - 1 - Antitrypsin  -     Hepatic Function Panel  -     CT Abdomen Pelvis With Contrast; Future    3. Psoriasis        See workup as above for the abnormal LFTs.  Review of leflunomide indicates that there is a potential for hepatotoxicity.  If there is any severe LFT derangement, I would have him stop this medication.  Given his prior complex past with the appendectomy, and subsequent abscess, I will obtain a CT scan to rule out any residual abscess, adhesions etc.  Further recommendations to follow after the above workup is completed.    Follow Up:   Return in about 3 months (around 10/15/2024).    Dee Snyder MD  [facility] [place]    7/15/2024  10:28 EDT    Part of this note may be an electronic transcription/translation of spoken language to printed text using the Dragon Dictation System.

## 2024-07-19 ENCOUNTER — PATIENT ROUNDING (BHMG ONLY) (OUTPATIENT)
Dept: GASTROENTEROLOGY | Facility: CLINIC | Age: 44
End: 2024-07-19
Payer: COMMERCIAL

## 2024-07-19 NOTE — PROGRESS NOTES
July 19, 2024    Hello, may I speak with Omar Gan?    My name is Maria Del Carmen     I am  with [facility]  [address]  [facility] [address] [phone].    Before we get started may I verify your date of birth? 1980    I am calling to officially welcome you to our practice and ask about your recent visit. Is this a good time to talk? yes    Tell me about your visit with us. What things went well?  Very good, very friendly       We're always looking for ways to make our patients' experiences even better. Do you have recommendations on ways we may improve?  no    Overall were you satisfied with your first visit to our practice? yes       I appreciate you taking the time to speak with me today. Is there anything else I can do for you? no      Thank you, and have a great day.

## 2024-07-25 ENCOUNTER — LAB (OUTPATIENT)
Dept: LAB | Facility: HOSPITAL | Age: 44
End: 2024-07-25
Payer: COMMERCIAL

## 2024-07-25 DIAGNOSIS — R79.89 ABNORMAL LFTS: ICD-10-CM

## 2024-07-25 DIAGNOSIS — R10.9 ABDOMINAL PAIN, UNSPECIFIED ABDOMINAL LOCATION: ICD-10-CM

## 2024-07-25 PROCEDURE — 83516 IMMUNOASSAY NONANTIBODY: CPT

## 2024-07-25 PROCEDURE — 86376 MICROSOMAL ANTIBODY EACH: CPT

## 2024-07-25 PROCEDURE — 82103 ALPHA-1-ANTITRYPSIN TOTAL: CPT | Performed by: INTERNAL MEDICINE

## 2024-07-25 PROCEDURE — 86381 MITOCHONDRIAL ANTIBODY EACH: CPT | Performed by: INTERNAL MEDICINE

## 2024-07-25 PROCEDURE — 82390 ASSAY OF CERULOPLASMIN: CPT

## 2024-07-25 PROCEDURE — 36415 COLL VENOUS BLD VENIPUNCTURE: CPT

## 2024-07-26 LAB
ALPHA1 GLOB MFR UR ELPH: 98 MG/DL (ref 90–200)
CERULOPLASMIN SERPL-MCNC: 16 MG/DL (ref 16–31)

## 2024-07-27 LAB — MITOCHONDRIA M2 IGG SER-ACNC: <20 UNITS (ref 0–20)

## 2024-07-29 ENCOUNTER — TELEPHONE (OUTPATIENT)
Dept: GASTROENTEROLOGY | Facility: CLINIC | Age: 44
End: 2024-07-29
Payer: COMMERCIAL

## 2024-07-29 NOTE — TELEPHONE ENCOUNTER
Hub staff attempted to follow warm transfer process and was unsuccessful     Caller: Omar Gan    Relationship to patient: Self    Best call back number: [phone]    Patient is needing: CALLING FREAD BACK, HE DOESN'T KNOW WHAT IT WAS IN REGARDS TO AND THERE'S NO ENCOUNTER

## 2024-07-30 LAB — SOLUBLE LIVER IGG SER IA-ACNC: 2 UNITS (ref 0–20)

## 2024-07-31 NOTE — PROGRESS NOTES
Please give the patient the following message:  ----- Results -----  Soluble liver antigen is negative.

## 2024-08-05 LAB
LIVER CYTOSOL TYPE 1 (LC 1): <10 TITER
Lab: NORMAL
Lab: NORMAL
PATHOLOGIST NAME: NORMAL
RESULT: NORMAL
SUBSTRATE: NORMAL

## 2024-08-20 DIAGNOSIS — R79.89 ABNORMAL LFTS: ICD-10-CM

## 2024-08-20 DIAGNOSIS — R10.9 ABDOMINAL PAIN, UNSPECIFIED ABDOMINAL LOCATION: Primary | ICD-10-CM

## 2024-08-20 DIAGNOSIS — Z87.898 HISTORY OF ABDOMINAL ABSCESS: ICD-10-CM

## 2024-08-26 ENCOUNTER — HOSPITAL ENCOUNTER (OUTPATIENT)
Dept: ULTRASOUND IMAGING | Facility: HOSPITAL | Age: 44
Discharge: HOME | End: 2024-08-26
Admitting: INTERNAL MEDICINE
Payer: COMMERCIAL

## 2024-08-26 DIAGNOSIS — R79.89 ABNORMAL LFTS: ICD-10-CM

## 2024-08-26 DIAGNOSIS — R10.9 ABDOMINAL PAIN, UNSPECIFIED ABDOMINAL LOCATION: ICD-10-CM

## 2024-08-26 DIAGNOSIS — Z87.898 HISTORY OF ABDOMINAL ABSCESS: ICD-10-CM

## 2024-08-26 PROCEDURE — 76700 US EXAM ABDOM COMPLETE: CPT

## 2024-08-28 NOTE — PROGRESS NOTES
Please give the patient the following message:  ----- Results -----  Ultrasound reveals fatty liver disease.  No concerning findings identified.

## 2024-09-09 ENCOUNTER — TELEPHONE (OUTPATIENT)
Dept: GASTROENTEROLOGY | Facility: CLINIC | Age: 44
End: 2024-09-09
Payer: COMMERCIAL

## 2024-09-09 NOTE — TELEPHONE ENCOUNTER
Pt called and stated that insurance has declined his upcoming CT scan and they (insurer) are  requesting an ultrasound in lieu.

## 2024-10-22 ENCOUNTER — OFFICE VISIT (OUTPATIENT)
Dept: GASTROENTEROLOGY | Facility: CLINIC | Age: 44
End: 2024-10-22
Payer: COMMERCIAL

## 2024-10-22 ENCOUNTER — LAB (OUTPATIENT)
Dept: LAB | Facility: HOSPITAL | Age: 44
End: 2024-10-22
Payer: COMMERCIAL

## 2024-10-22 VITALS
HEART RATE: 64 BPM | DIASTOLIC BLOOD PRESSURE: 78 MMHG | WEIGHT: 229.8 LBS | OXYGEN SATURATION: 95 % | BODY MASS INDEX: 31.17 KG/M2 | SYSTOLIC BLOOD PRESSURE: 116 MMHG

## 2024-10-22 DIAGNOSIS — R79.89 ABNORMAL LFTS: ICD-10-CM

## 2024-10-22 DIAGNOSIS — R10.9 ABDOMINAL PAIN, UNSPECIFIED ABDOMINAL LOCATION: ICD-10-CM

## 2024-10-22 DIAGNOSIS — R10.9 ABDOMINAL PAIN, UNSPECIFIED ABDOMINAL LOCATION: Primary | ICD-10-CM

## 2024-10-22 PROCEDURE — 82465 ASSAY BLD/SERUM CHOLESTEROL: CPT | Performed by: INTERNAL MEDICINE

## 2024-10-22 PROCEDURE — 36415 COLL VENOUS BLD VENIPUNCTURE: CPT | Performed by: INTERNAL MEDICINE

## 2024-10-22 PROCEDURE — 86015 ACTIN ANTIBODY EACH: CPT

## 2024-10-22 PROCEDURE — 83010 ASSAY OF HAPTOGLOBIN QUANT: CPT | Performed by: INTERNAL MEDICINE

## 2024-10-22 PROCEDURE — 83883 ASSAY NEPHELOMETRY NOT SPEC: CPT | Performed by: INTERNAL MEDICINE

## 2024-10-22 PROCEDURE — 84450 TRANSFERASE (AST) (SGOT): CPT | Performed by: INTERNAL MEDICINE

## 2024-10-22 PROCEDURE — 82977 ASSAY OF GGT: CPT | Performed by: INTERNAL MEDICINE

## 2024-10-22 PROCEDURE — 84478 ASSAY OF TRIGLYCERIDES: CPT | Performed by: INTERNAL MEDICINE

## 2024-10-22 PROCEDURE — 82172 ASSAY OF APOLIPOPROTEIN: CPT | Performed by: INTERNAL MEDICINE

## 2024-10-22 PROCEDURE — 82947 ASSAY GLUCOSE BLOOD QUANT: CPT | Performed by: INTERNAL MEDICINE

## 2024-10-22 PROCEDURE — 82247 BILIRUBIN TOTAL: CPT | Performed by: INTERNAL MEDICINE

## 2024-10-22 PROCEDURE — 84460 ALANINE AMINO (ALT) (SGPT): CPT | Performed by: INTERNAL MEDICINE

## 2024-10-22 NOTE — PROGRESS NOTES
Follow Up Note     Date: 10/22/2024   Patient Name: Omar Gan  MRN: [id number]  : 1980     Referring Physician: Nika Burk APRN    Chief Complaint:    Chief Complaint   Patient presents with    Follow-up    Abdominal Pain       Interval History:   10/22/2024  Omar Gan is a 44 y.o. male who is here today for follow up.    US showed fatty infiltration.    Transaminases are mildly elevated.      He has cut out energy drinks, and beer.  Was not a significantly heavy drinker however.    Subjective      Past Medical History:   Diagnosis Date    Allergic rhinitis     Asthma     HSV-2 (herpes simplex virus 2) infection     Liver disease     Fatty liver    Psoriasis      Past Surgical History:   Procedure Laterality Date    APPENDECTOMY N/A 2023    Procedure: APPENDECTOMY LAPAROSCOPIC;  Surgeon: Mayito Wyatt MD;  Location: [facility];  Service: General;  Laterality: N/A;    HERNIA REPAIR       Family History   Problem Relation Age of Onset    Asthma Mother      Social History     Socioeconomic History    Marital status:    Tobacco Use    Smoking status: Former     Current packs/day: 0.00     Types: Cigarettes     Start date: 4/10/1998     Quit date: 5/15/1999     Years since quittin.4    Smokeless tobacco: Never   Vaping Use    Vaping status: Never Used   Substance and Sexual Activity    Alcohol use: Yes     Alcohol/week: 1.0 standard drink of alcohol     Types: 1 Cans of beer per week    Drug use: Never    Sexual activity: Not Currently     Partners: Female     Birth control/protection: Abstinence       Current Outpatient Medications:     leflunomide (ARAVA) 10 MG tablet, Take 1 tablet by mouth Daily. (Patient not taking: Reported on 10/22/2024), Disp: , Rfl:     predniSONE (DELTASONE) 10 MG tablet, Take 1 tablet by mouth Daily. (Patient not taking: Reported on 10/22/2024), Disp: , Rfl:   No Known Allergies  Review of Systems   HENT:  Negative for trouble  swallowing.    Gastrointestinal:  Positive for abdominal pain and GERD. Negative for abdominal distention, anal bleeding, blood in stool, constipation, diarrhea, nausea, rectal pain, vomiting and indigestion.       The following portions of the patient's history were reviewed and updated as appropriate: allergies, current medications, past family history, past medical history, past social history, past surgical history and problem list.    Objective     Physical Exam:  Vitals:    10/22/24 1025   BP: 116/78   Pulse: 64   SpO2: 95%   Weight: 104 kg (229 lb 12.8 oz)       Physical Exam  Constitutional:       General: He is not in acute distress.     Appearance: Normal appearance.   HENT:      Head: Normocephalic and atraumatic.   Eyes:      General: No scleral icterus.     Conjunctiva/sclera: Conjunctivae normal.   Cardiovascular:      Rate and Rhythm: Normal rate.   Pulmonary:      Effort: Pulmonary effort is normal. No respiratory distress.   Skin:     Coloration: Skin is not jaundiced or pale.   Neurological:      Mental Status: He is alert.   Psychiatric:         Mood and Affect: Mood normal.         Behavior: Behavior normal.         Results Review:   I reviewed the patient's new clinical results.    Lab on 07/25/2024   Component Date Value Ref Range Status    Ceruloplasmin 07/25/2024 16  16 - 31 mg/dL Final    Soluble Liver Antigen, IgG 07/25/2024 2.0  0.0 - 20.0 units Final                                    Negative    0.0 - 20.0                                  Equivocal  20.1 - 24.9                                  Positive         >24.9    Result 07/25/2024 Comment   Final    Negative    Substrate 07/25/2024 Comment   Final    Mouse kidney/liver/stomach    Liver Cytosol Type 1 (LC 1)^ 07/25/2024 <10  Titer Final    <10: Negative  >/=10: Positive    Clinical Relevance 07/25/2024 Notes   Final    Anti-LC1 and Anti-LKM1 are serologic markers of  AIH-2 (type 2 Autoimmune Hepatitis). The two  autoantibodies often  co-exist with reported  prevalence of 53% for anti-LC1 and 66% for  anti-LKM1, respectively. Anti-LC1 is present  alone in 10% patients with AIH-2. These  antibodies have also been reported in 5%-10%  patients with chronic HCV infection.    Disclaimer 07/25/2024 Notes   Final    This test result was developed and its  performance characteristics determined by [facility] It has not been cleared or approved by  the U.S. Food and Drug Administration.    Electronically signed by: 07/25/2024 Comment   Final    Clayton Collette      US Abdomen Complete    Result Date: 8/26/2024  Fatty infiltration of the liver.   This report was finalized on 8/26/2024 10:16 AM by Noman Ingram M.D..       Assessment / Plan      Diagnoses and all orders for this visit:    1. Abdominal pain, unspecified abdominal location (Primary)  -     ALMONTE Fibrosure    2. Abnormal LFTs  -     ALMONTE Fibrosure         Will order a Almonte FibroSure.  Reviewed existing testing with him.    Counseled regarding lifestyle modifications and dietary modifications for nonalcoholic fatty liver disease.    Suggested aerobic exercise at regular intervals.    Suggested low-fat diet, calorie restriction.    No overt evidence of severe hepatic decompensation at this time.  Down the road we may consider resmetirom.    Follow Up:   Return in about 6 months (around 4/22/2025).    Dee Snyder MD  [facility] [place]  10/22/2024  12:13 EDT     Part of this note may be an electronic transcription/translation of spoken language to printed text using the Dragon Dictation System.

## 2024-10-23 LAB — SMA IGG SER-ACNC: 17 UNITS (ref 0–19)

## 2024-10-24 LAB
A2 MACROGLOB SERPL-MCNC: 101 MG/DL (ref 110–276)
ALT SERPL W P-5'-P-CCNC: 73 IU/L (ref 0–55)
APO A-I SERPL-MCNC: 112 MG/DL (ref 101–178)
AST SERPL W P-5'-P-CCNC: 29 IU/L (ref 0–40)
BILIRUB SERPL-MCNC: 1.2 MG/DL (ref 0–1.2)
CHOLEST SERPL-MCNC: 179 MG/DL (ref 100–199)
FIBROSIS SCORING:: ABNORMAL
FIBROSIS STAGE SERPL QL: ABNORMAL
GGT SERPL-CCNC: 39 IU/L (ref 0–65)
GLUCOSE SERPL-MCNC: 97 MG/DL (ref 70–99)
HAPTOGLOB SERPL-MCNC: 28 MG/DL (ref 23–355)
LABORATORY COMMENT REPORT: ABNORMAL
LIVER FIBR SCORE SERPL CALC.FIBROSURE: 0.34 (ref 0–0.21)
LIVER STEATOSIS GRADE SERPL QL: ABNORMAL
LIVER STEATOSIS SCORE SERPL: 0.71 (ref 0–0.4)
NASH GRADE SERPL QL: ABNORMAL
NASH INTERPRETATION SERPL-IMP: ABNORMAL
NASH SCORE SERPL: 0.56 (ref 0–0.25)
NASH SCORING: ABNORMAL
STEATOSIS SCORING: ABNORMAL
TEST PERFORMANCE INFO SPEC: ABNORMAL
TEST PERFORMANCE INFO SPEC: ABNORMAL
TRIGL SERPL-MCNC: 179 MG/DL (ref 0–149)

## 2024-10-24 NOTE — PROGRESS NOTES
Please give the patient the following message:  ----- Results -----  Very early fibrosis noted.  Steatosis identified.  No change to outpatient plan as discussed in the office visit.  The test will serve as a baseline for the future.   Autoimmune testing is negative.

## 2025-04-22 ENCOUNTER — OFFICE VISIT (OUTPATIENT)
Dept: GASTROENTEROLOGY | Facility: CLINIC | Age: 45
End: 2025-04-22
Payer: COMMERCIAL

## 2025-04-22 ENCOUNTER — LAB (OUTPATIENT)
Dept: LAB | Facility: HOSPITAL | Age: 45
End: 2025-04-22
Payer: COMMERCIAL

## 2025-04-22 VITALS
SYSTOLIC BLOOD PRESSURE: 128 MMHG | HEART RATE: 56 BPM | WEIGHT: 232 LBS | OXYGEN SATURATION: 95 % | BODY MASS INDEX: 31.46 KG/M2 | DIASTOLIC BLOOD PRESSURE: 84 MMHG

## 2025-04-22 DIAGNOSIS — R10.84 GENERALIZED ABDOMINAL PAIN: ICD-10-CM

## 2025-04-22 DIAGNOSIS — K74.00 LIVER FIBROSIS: ICD-10-CM

## 2025-04-22 DIAGNOSIS — K75.81 METABOLIC DYSFUNCTION-ASSOCIATED STEATOHEPATITIS (MASH): Primary | ICD-10-CM

## 2025-04-22 DIAGNOSIS — R74.8 ELEVATED LIVER ENZYMES: ICD-10-CM

## 2025-04-22 DIAGNOSIS — K75.81 METABOLIC DYSFUNCTION-ASSOCIATED STEATOHEPATITIS (MASH): ICD-10-CM

## 2025-04-22 LAB
ALBUMIN SERPL-MCNC: 4.3 G/DL (ref 3.5–5.2)
ALBUMIN/GLOB SERPL: 1.4 G/DL
ALP SERPL-CCNC: 91 U/L (ref 39–117)
ALT SERPL W P-5'-P-CCNC: 162 U/L (ref 1–41)
ANION GAP SERPL CALCULATED.3IONS-SCNC: 6.8 MMOL/L (ref 5–15)
AST SERPL-CCNC: 67 U/L (ref 1–40)
BILIRUB SERPL-MCNC: 1.4 MG/DL (ref 0–1.2)
BUN SERPL-MCNC: 13 MG/DL (ref 6–20)
BUN/CREAT SERPL: 16.9 (ref 7–25)
CALCIUM SPEC-SCNC: 9.1 MG/DL (ref 8.6–10.5)
CHLORIDE SERPL-SCNC: 107 MMOL/L (ref 98–107)
CO2 SERPL-SCNC: 22.2 MMOL/L (ref 22–29)
CREAT SERPL-MCNC: 0.77 MG/DL (ref 0.76–1.27)
EGFRCR SERPLBLD CKD-EPI 2021: 113.2 ML/MIN/1.73
FERRITIN SERPL-MCNC: 57.4 NG/ML (ref 30–400)
GLOBULIN UR ELPH-MCNC: 3.1 GM/DL
GLUCOSE SERPL-MCNC: 112 MG/DL (ref 65–99)
HBV SURFACE AB SER RIA-ACNC: NORMAL
HBV SURFACE AG SERPL QL IA: NORMAL
IGA1 MFR SER: 297 MG/DL (ref 70–400)
IGG1 SER-MCNC: 1712 MG/DL (ref 700–1600)
INR PPP: 1.03 (ref 0.9–1.1)
IRON 24H UR-MRATE: 82 MCG/DL (ref 59–158)
IRON SATN MFR SERPL: 17 % (ref 20–50)
POTASSIUM SERPL-SCNC: 3.9 MMOL/L (ref 3.5–5.2)
PROT SERPL-MCNC: 7.4 G/DL (ref 6–8.5)
PROTHROMBIN TIME: 14.2 SECONDS (ref 12.3–15.1)
SODIUM SERPL-SCNC: 136 MMOL/L (ref 136–145)
TIBC SERPL-MCNC: 480 MCG/DL (ref 298–536)
TRANSFERRIN SERPL-MCNC: 322 MG/DL (ref 200–360)

## 2025-04-22 PROCEDURE — 85610 PROTHROMBIN TIME: CPT

## 2025-04-22 PROCEDURE — 99214 OFFICE O/P EST MOD 30 MIN: CPT | Performed by: PHYSICIAN ASSISTANT

## 2025-04-22 PROCEDURE — 82728 ASSAY OF FERRITIN: CPT

## 2025-04-22 PROCEDURE — 36415 COLL VENOUS BLD VENIPUNCTURE: CPT

## 2025-04-22 PROCEDURE — 86364 TISS TRNSGLTMNASE EA IG CLAS: CPT

## 2025-04-22 PROCEDURE — 87340 HEPATITIS B SURFACE AG IA: CPT

## 2025-04-22 PROCEDURE — 86706 HEP B SURFACE ANTIBODY: CPT

## 2025-04-22 PROCEDURE — 84466 ASSAY OF TRANSFERRIN: CPT

## 2025-04-22 PROCEDURE — 86704 HEP B CORE ANTIBODY TOTAL: CPT

## 2025-04-22 PROCEDURE — 86038 ANTINUCLEAR ANTIBODIES: CPT

## 2025-04-22 PROCEDURE — 83540 ASSAY OF IRON: CPT

## 2025-04-22 PROCEDURE — 80053 COMPREHEN METABOLIC PANEL: CPT

## 2025-04-22 PROCEDURE — 86803 HEPATITIS C AB TEST: CPT

## 2025-04-22 PROCEDURE — 86708 HEPATITIS A ANTIBODY: CPT

## 2025-04-22 PROCEDURE — 82784 ASSAY IGA/IGD/IGG/IGM EACH: CPT

## 2025-04-22 RX ORDER — PREDNISONE 10 MG/1
TABLET ORAL
COMMUNITY
Start: 2025-04-01

## 2025-04-22 NOTE — PROGRESS NOTES
Follow Up Note     Date: 2025   Patient Name: Omar Gan  MRN: [id number]  : 1980     Primary Care Provider: Nika Burk APRN     Chief Complaint   Patient presents with    Hepatic Disease     History of present illness:   2025  Omar Gan is a 44 y.o. male who is here today for follow up regarding Hepatic Disease, Elevated Hepatic Enzymes, and Abnormal Imaging.    He would like to disucss recent labs and history of elevated liver enzymes. Had labs and imaging in the past and wonders if he needs repeat imaging. Has history of fatty liver. Has been trying to work on changing his diet. No history of diabetes. On omega 3 for TG. Stopped drinking alcohol daily in 2018, had been drinking up to 12 pack beer per day for 2-3 years. Now has beer about 2 days per week, drinks 3-4 beers each time. Rare small amounts of liquor. Has also been seeing rhematology, was recently told his liver enzymes were elevated worse than previous. He does take Humira inj.     Also, previously had CT ordered that was denied and he wonders if that can be re-ordered to evaluate intermittent generalized abdominal pain, worse in right flank to right back. BMs daily and regular. No nausea or vomiting. No significant reflux symptoms.     Last colonoscopy was in  and has history of colon polyps.     Interval History:  2024 Dr. Snyder  The referral was sent for abnormal LFTs.  I am not able to review any recent LFTs on file.  Most of the available testing is from a year ago, when he had a complicated course with appendicitis.  He underwent an appendectomy, and unfortunately subsequent to that he developed a abscess, which was drained using IR.  The drain was subsequently removed as an outpatient.  He has had a relatively uneventful course since then, but he does complain of some discomfort in the right upper abdomen.  That is where the drain was.  He feels that there is sometimes a sharp pain when he takes  a deep breath, and it bothers him if he is running or jogging or working out.  He also has a history of psoriasis, and is on leflunomide.  Review of the medication indicates that there is potential for hepatotoxicity.  There is no jaundice.  On abdominal exam he is not particularly tender to palpation.    Subjective     Past Medical History:   Diagnosis Date    Allergic rhinitis     Asthma     Fatty liver 2024    elevated liver enzymes    HSV-2 (herpes simplex virus 2) infection     Liver disease     Fatty liver    Psoriasis      Past Surgical History:   Procedure Laterality Date    APPENDECTOMY N/A 2023    Procedure: APPENDECTOMY LAPAROSCOPIC;  Surgeon: Mayito Wyatt MD;  Location: [facility];  Service: General;  Laterality: N/A;    COLONOSCOPY  2024    HERNIA REPAIR  2018     Family History   Problem Relation Age of Onset    Asthma Mother     Liver disease Mother      Social History     Socioeconomic History    Marital status:    Tobacco Use    Smoking status: Former     Current packs/day: 0.00     Average packs/day: 0.3 packs/day for 1 year (0.3 ttl pk-yrs)     Types: Cigarettes     Start date: 4/10/1998     Quit date: 5/15/1999     Years since quittin.9    Smokeless tobacco: Never    Tobacco comments:     socially   Vaping Use    Vaping status: Never Used   Substance and Sexual Activity    Alcohol use: Yes     Alcohol/week: 6.0 standard drinks of alcohol     Types: 6 Cans of beer per week    Drug use: Never    Sexual activity: Not Currently     Partners: Female     Birth control/protection: Abstinence       Current Outpatient Medications:     Humira, 2 Pen, 40 MG/0.4ML Auto-injector Kit, , Disp: , Rfl:     Omega-3 Fatty Acids (OMEGA 3 500 PO), Take  by mouth., Disp: , Rfl:     predniSONE (DELTASONE) 10 MG tablet, TAKE 1 TABLET BY MOUTH EVERY DAY FOR 2 TO 5 DAYS AS NEEDED FOR PAIN OR FLARE UP, Disp: , Rfl:     No Known Allergies    The following portions of the  patient's history were reviewed and updated as appropriate: allergies, current medications, past family history, past medical history, past social history, past surgical history and problem list.    Objective     Physical Exam  Constitutional:       General: He is not in acute distress.     Appearance: Normal appearance. He is well-developed. He is not diaphoretic.   HENT:      Head: Normocephalic and atraumatic.      Right Ear: External ear normal.      Left Ear: External ear normal.      Nose: Nose normal.   Eyes:      General: No scleral icterus.        Right eye: No discharge.         Left eye: No discharge.      Conjunctiva/sclera: Conjunctivae normal.   Neck:      Trachea: No tracheal deviation.   Pulmonary:      Effort: Pulmonary effort is normal. No respiratory distress.   Musculoskeletal:         General: Normal range of motion.      Cervical back: Normal range of motion.   Skin:     Coloration: Skin is not pale.      Findings: No erythema or rash.   Neurological:      Mental Status: He is alert and oriented to person, place, and time.      Coordination: Coordination normal.   Psychiatric:         Mood and Affect: Mood normal.         Behavior: Behavior normal.         Thought Content: Thought content normal.         Judgment: Judgment normal.       Vitals:    04/22/25 1145   BP: 128/84   Pulse: 56   SpO2: 95%   Weight: 105 kg (232 lb)     Results Review:   I reviewed the patient's new clinical results.    Hepatic Function Panel (07/15/2024 10:35)  Mitochondrial Antibodies, M2 (07/25/2024 15:07)  Alpha - 1 - Antitrypsin (07/25/2024 15:07)  Ceruloplasmin (07/25/2024 15:07)  Soluble Liver Ag (IgG Ab) (07/25/2024 15:07)  Liver Cytosol Type 1 Abs (07/25/2024 15:07)  ALMONTE Fibrosure (10/22/2024 10:54)  Anti-Smooth Muscle Antibody Titer (10/22/2024 10:54)    US abd 8/2024  FINDINGS: .  The visualized pancreas is unremarkable. The liver is echogenic  consistent with fatty infiltration. The gallbladder Is  unremarkable with  no shadowing stones or wall thickening.  The common duct measures 4.7 mm. The right kidney measures 13.9 cm in length and is normal in  echogenicity without hydronephrosis. The left kidney measures 14.0 cm in length and is normal in echogenicity without hydronephrosis.  Spleen is unremarkable.  The aorta is normal in caliber. The vena cava is  unremarkable.  IMPRESSION:  Fatty infiltration of the liver.    Last colonoscopy was in 2023 by Dr. Hernandez showed   Findings:   1. Small thrombosed external hemorrhoid remains  2. Small polyp removed in transverse colon with cold wire   Path- polyp was tubular adenoma     Assessment / Plan      1. Metabolic dysfunction-associated steatohepatitis (MASH)  2. Liver fibrosis  3. Elevated liver enzymes  4. Generalized abdominal pain  He has had elevated liver enzymes for a while now, at least since 2023 with lab result that are available to review. He had partial liver workup back in 2024 which showed fatty liver on imaging. Labs 7/2024 showed antimitochondrial body normal, alpha-1 antitrypsin normal, ceruloplasmin normal, soluble liver antigen normal, liver cytosol type I normal, Almonte FibroSure with F1-F2 stage, N2 moderate ALMONTE, S2-S3 moderate to severe steatosis, GGT normal 39.  At that time liver enzymes showed AST 44, , alkaline phosphatase 75, total bilirubin 1.2.  He states that since then he has been to his rheumatologist and they have told him that his liver enzymes are worsening.  He does drink alcohol but no longer daily and only a couple times a week.  Having generalized abdominal pain worse in the right flank and right back.  He still needs further evaluation of his elevated liver enzymes, concern for autoimmune hepatitis.    Work on weight loss, goal to lose 23 lbs in the next 6 months  Mediterranean diet recommended  Avoid all alcohol use for now  Fasting labs today  CTAP with both IV and oral contrast    - Comprehensive Metabolic Panel;  Future  - Protime-INR; Future  - ALMONTE Fibrosure Plus; Future  - HCV Antibody Rfx To Qnt PCR; Future  - Hepatitis B Surface Antigen; Future  - Ferritin; Future  - Hepatitis A Antibody, Total; Future  - Hepatitis B Core Antibody, Total; Future  - Hepatitis B Surface Antibody; Future  - IgG; Future  - Iron Profile; Future  - NARDA by IFA, Reflex to Titer and Pattern; Future  - Tissue Transglutaminase, IgA; Future  - IgA; Future    - CT Abdomen Pelvis With Contrast; Future    5. History of adenomatous colon polyps  Last colonoscopy 2023 had 1 tubular adenoma removed. No family history of colon cancer.     Repeat colonoscopy in 5 years for surveillance, due 8/2028      Follow Up:   Return in about 3 months (around 7/22/2025) for discussion of results, recheck liver disease.    Kelly Mcclellan PA-C  [facility] [place]  4/24/2025  12:47 EDT

## 2025-04-23 LAB
HAV AB SER QL IA: POSITIVE
HBV CORE AB SERPL QL IA: NEGATIVE
HCV AB SERPL QL IA: NON REACTIVE
HCV AB SERPL QL IA: NORMAL
TTG IGA SER-ACNC: <2 U/ML (ref 0–3)

## 2025-04-23 PROCEDURE — 82465 ASSAY BLD/SERUM CHOLESTEROL: CPT

## 2025-04-23 PROCEDURE — 82947 ASSAY GLUCOSE BLOOD QUANT: CPT

## 2025-04-23 PROCEDURE — 84478 ASSAY OF TRIGLYCERIDES: CPT

## 2025-04-23 PROCEDURE — 84460 ALANINE AMINO (ALT) (SGPT): CPT

## 2025-04-23 PROCEDURE — 83010 ASSAY OF HAPTOGLOBIN QUANT: CPT

## 2025-04-23 PROCEDURE — 82172 ASSAY OF APOLIPOPROTEIN: CPT

## 2025-04-23 PROCEDURE — 83883 ASSAY NEPHELOMETRY NOT SPEC: CPT

## 2025-04-23 PROCEDURE — 84450 TRANSFERASE (AST) (SGOT): CPT

## 2025-04-23 PROCEDURE — 82247 BILIRUBIN TOTAL: CPT

## 2025-04-23 PROCEDURE — 82977 ASSAY OF GGT: CPT

## 2025-04-24 ENCOUNTER — RESULTS FOLLOW-UP (OUTPATIENT)
Dept: GASTROENTEROLOGY | Facility: CLINIC | Age: 45
End: 2025-04-24
Payer: COMMERCIAL

## 2025-04-25 LAB — ANA SER QL IF: NEGATIVE

## 2025-04-26 LAB
A2 MACROGLOB SERPL-MCNC: 104 MG/DL (ref 110–276)
ALT SERPL W P-5'-P-CCNC: 181 IU/L (ref 0–55)
APO A-I SERPL-MCNC: 109 MG/DL (ref 101–178)
AST SERPL W P-5'-P-CCNC: 66 IU/L (ref 0–40)
BILIRUB SERPL-MCNC: 0.6 MG/DL (ref 0–1.2)
CHOLEST SERPL-MCNC: 171 MG/DL (ref 100–199)
FIBROSIS SCORING:: ABNORMAL
FIBROSIS STAGE SERPL QL: ABNORMAL
GGT SERPL-CCNC: 69 IU/L (ref 0–65)
GLUCOSE SERPL-MCNC: 97 MG/DL (ref 70–99)
HAPTOGLOB SERPL-MCNC: 16 MG/DL (ref 23–355)
LABORATORY COMMENT REPORT: ABNORMAL
LIVER FIBR SCORE SERPL CALC.FIBROSURE: 0.38 (ref 0–0.21)
LIVER STEATOSIS GRADE SERPL QL: ABNORMAL
LIVER STEATOSIS SCORE SERPL: 0.84 (ref 0–0.4)
NASH GRADE SERPL QL: ABNORMAL
NASH INTERPRETATION SERPL-IMP: ABNORMAL
NASH SCORE SERPL: 0.8 (ref 0–0.25)
NASH SCORING: ABNORMAL
STEATOSIS SCORING: ABNORMAL
TEST PERFORMANCE INFO SPEC: ABNORMAL
TEST PERFORMANCE INFO SPEC: ABNORMAL
TRIGL SERPL-MCNC: 285 MG/DL (ref 0–149)

## 2025-06-02 ENCOUNTER — HOSPITAL ENCOUNTER (OUTPATIENT)
Dept: CT IMAGING | Facility: HOSPITAL | Age: 45
Discharge: HOME | End: 2025-06-02
Admitting: PHYSICIAN ASSISTANT
Payer: COMMERCIAL

## 2025-06-02 DIAGNOSIS — R10.84 GENERALIZED ABDOMINAL PAIN: ICD-10-CM

## 2025-06-02 DIAGNOSIS — R74.8 ELEVATED LIVER ENZYMES: ICD-10-CM

## 2025-06-02 PROCEDURE — 74177 CT ABD & PELVIS W/CONTRAST: CPT

## 2025-06-02 PROCEDURE — 25510000001 IOPAMIDOL 61 % SOLUTION: Performed by: PHYSICIAN ASSISTANT

## 2025-06-02 RX ORDER — IOPAMIDOL 612 MG/ML
100 INJECTION, SOLUTION INTRAVASCULAR
Status: COMPLETED | OUTPATIENT
Start: 2025-06-02 | End: 2025-06-02

## 2025-06-02 RX ADMIN — IOPAMIDOL 100 ML: 612 INJECTION, SOLUTION INTRAVENOUS at 13:41

## 2025-07-29 ENCOUNTER — OFFICE VISIT (OUTPATIENT)
Dept: GASTROENTEROLOGY | Facility: CLINIC | Age: 45
End: 2025-07-29
Payer: COMMERCIAL

## 2025-07-29 VITALS
BODY MASS INDEX: 31.73 KG/M2 | WEIGHT: 234 LBS | SYSTOLIC BLOOD PRESSURE: 144 MMHG | OXYGEN SATURATION: 95 % | HEART RATE: 65 BPM | DIASTOLIC BLOOD PRESSURE: 88 MMHG

## 2025-07-29 DIAGNOSIS — K75.81 METABOLIC DYSFUNCTION-ASSOCIATED STEATOHEPATITIS (MASH): Primary | ICD-10-CM

## 2025-07-29 DIAGNOSIS — K74.00 LIVER FIBROSIS: ICD-10-CM

## 2025-07-29 DIAGNOSIS — R74.8 ELEVATED LIVER ENZYMES: ICD-10-CM

## 2025-07-29 PROCEDURE — 99214 OFFICE O/P EST MOD 30 MIN: CPT | Performed by: PHYSICIAN ASSISTANT

## (undated) DEVICE — THE ECHELON FLEX POWERED PLUS ARTICULATING ENDOSCOPIC LINEAR CUTTERS ARE STERILE, SINGLE PATIENT USE INSTRUMENTS THAT SIMULTANEOUSLYCUT AND STAPLE TISSUE. THERE ARE SIX STAGGERED ROWS OF STAPLES, THREE ON EITHER SIDE OF THE CUT LINE. THE ECHELON FLEX 45 POWERED PLUSINSTRUMENTS HAVE A STAPLE LINE THAT IS APPROXIMATELY 45 MM LONG AND A CUT LINE THAT IS APPROXIMATELY 42 MM LONG. THE SHAFT CAN ROTATE FREELYIN BOTH DIRECTIONS AND AN ARTICULATION MECHANISM ENABLES THE DISTAL PORTION OF THE SHAFT TO PIVOT TO FACILITATE LATERAL ACCESS TO THE OPERATIVESITE.THE INSTRUMENTS ARE PACKAGED WITH A PRIMARY LITHIUM BATTERY PACK THAT MUST BE INSTALLED PRIOR TO USE. THERE ARE SPECIFIC REQUIREMENTS FORDISPOSING OF THE BATTERY PACK. REFER TO THE BATTERY PACK DISPOSAL SECTION.THE INSTRUMENTS ARE PACKAGED WITHOUT A RELOAD AND MUST BE LOADED PRIOR TO USE. A STAPLE RETAINING CAP ON THE RELOAD PROTECTS THE STAPLE LEGPOINTS DURING SHIPPING AND TRANSPORTATION. THE INSTRUMENTS’ LOCK-OUT FEATURE IS DESIGNED TO PREVENT A USED OR IMPROPERLY INSTALLED RELOADFROM BEING REFIRED OR AN INSTRUMENT FROM BEING FIRED WITHOUT A RELOAD.: Brand: ECHELON FLEX

## (undated) DEVICE — ADHS LIQ MASTISOL 2/3ML

## (undated) DEVICE — APPL HEMOS FOR DELIVERY FLOSEAL

## (undated) DEVICE — ENDOPATH XCEL BLADELESS TROCARS WITH STABILITY SLEEVES: Brand: ENDOPATH XCEL

## (undated) DEVICE — SUT VIC 0 UR6 27IN VCP603H

## (undated) DEVICE — ENDOPATH XCEL UNIVERSAL TROCAR STABLILITY SLEEVES: Brand: ENDOPATH XCEL

## (undated) DEVICE — [HIGH FLOW INSUFFLATOR,  DO NOT USE IF PACKAGE IS DAMAGED,  KEEP DRY,  KEEP AWAY FROM SUNLIGHT,  PROTECT FROM HEAT AND RADIOACTIVE SOURCES.]: Brand: PNEUMOSURE

## (undated) DEVICE — MONOPOLAR METZENBAUM SCISSOR TIP, DISPOSABLE: Brand: MONOPOLAR METZENBAUM SCISSOR TIP, DISPOSABLE

## (undated) DEVICE — UNDYED MONOFILAMENT (POLYDIOXANONE), ABSORBABLE SYNTHETIC SURGICAL SUTURE: Brand: PDS

## (undated) DEVICE — 2, DISPOSABLE SUCTION/IRRIGATOR WITHOUT DISPOSABLE TIP: Brand: STRYKEFLOW

## (undated) DEVICE — SUT VIC 0/0 UR6 27IN DYED J603H

## (undated) DEVICE — SLV SCD CALF HEMOFORCE DVT THERP REPROC MD

## (undated) DEVICE — GLV SURG SENSICARE W/ALOE PF LF 7.5 STRL

## (undated) DEVICE — BLD CLIP UNIV SURG GRY

## (undated) DEVICE — RICH GENERAL LAPAROSCOPY: Brand: MEDLINE INDUSTRIES, INC.

## (undated) DEVICE — ENDOPATH ETS-FLEX45 ARTICULATING ENDOSCOPIC LINEAR CUTTER, NO RELOAD: Brand: ENDOPATH

## (undated) DEVICE — SOL IRR NACL 0.9PCT BT 1000ML